# Patient Record
Sex: MALE | URBAN - METROPOLITAN AREA
[De-identification: names, ages, dates, MRNs, and addresses within clinical notes are randomized per-mention and may not be internally consistent; named-entity substitution may affect disease eponyms.]

---

## 2018-03-13 ENCOUNTER — APPOINTMENT (RX ONLY)
Dept: URBAN - METROPOLITAN AREA CLINIC 4 | Facility: CLINIC | Age: 28
Setting detail: DERMATOLOGY
End: 2018-03-13

## 2018-03-13 DIAGNOSIS — L81.4 OTHER MELANIN HYPERPIGMENTATION: ICD-10-CM

## 2018-03-13 DIAGNOSIS — Z71.89 OTHER SPECIFIED COUNSELING: ICD-10-CM

## 2018-03-13 DIAGNOSIS — L72.0 EPIDERMAL CYST: ICD-10-CM

## 2018-03-13 DIAGNOSIS — L70.0 ACNE VULGARIS: ICD-10-CM

## 2018-03-13 DIAGNOSIS — L30.9 DERMATITIS, UNSPECIFIED: ICD-10-CM

## 2018-03-13 DIAGNOSIS — L74.51 PRIMARY FOCAL HYPERHIDROSIS: ICD-10-CM

## 2018-03-13 DIAGNOSIS — L71.0 PERIORAL DERMATITIS: ICD-10-CM

## 2018-03-13 PROBLEM — L74.513 PRIMARY FOCAL HYPERHIDROSIS, SOLES: Status: ACTIVE | Noted: 2018-03-13

## 2018-03-13 PROCEDURE — ? TREATMENT REGIMEN

## 2018-03-13 PROCEDURE — 10060 I&D ABSCESS SIMPLE/SINGLE: CPT

## 2018-03-13 PROCEDURE — ? COUNSELING

## 2018-03-13 PROCEDURE — ? INCISION AND DRAINAGE

## 2018-03-13 PROCEDURE — ? PRESCRIPTION

## 2018-03-13 PROCEDURE — 99203 OFFICE O/P NEW LOW 30 MIN: CPT | Mod: 25

## 2018-03-13 PROCEDURE — ? ADDITIONAL NOTES

## 2018-03-13 RX ORDER — TRIAMCINOLONE ACETONIDE 1 MG/G
CREAM TOPICAL
Qty: 1 | Refills: 1 | Status: ERX | COMMUNITY
Start: 2018-03-13

## 2018-03-13 RX ORDER — ADAPALENE 3 MG/G
GEL TOPICAL
Qty: 1 | Refills: 5 | Status: ERX | COMMUNITY
Start: 2018-03-13

## 2018-03-13 RX ORDER — DOXYCYCLINE HYCLATE 100 MG/1
CAPSULE, GELATIN COATED ORAL BID
Qty: 60 | Refills: 1 | Status: ERX | COMMUNITY
Start: 2018-03-13

## 2018-03-13 RX ADMIN — ADAPALENE: 3 GEL TOPICAL at 16:31

## 2018-03-13 RX ADMIN — DOXYCYCLINE HYCLATE: 100 CAPSULE, GELATIN COATED ORAL at 16:29

## 2018-03-13 RX ADMIN — TRIAMCINOLONE ACETONIDE: 1 CREAM TOPICAL at 16:30

## 2018-03-13 ASSESSMENT — LOCATION SIMPLE DESCRIPTION DERM
LOCATION SIMPLE: LEFT PLANTAR SURFACE
LOCATION SIMPLE: CHEST
LOCATION SIMPLE: RIGHT FOREHEAD
LOCATION SIMPLE: RIGHT PLANTAR SURFACE
LOCATION SIMPLE: RIGHT CHEEK
LOCATION SIMPLE: LOWER BACK
LOCATION SIMPLE: GLABELLA
LOCATION SIMPLE: RIGHT UPPER BACK

## 2018-03-13 ASSESSMENT — LOCATION DETAILED DESCRIPTION DERM
LOCATION DETAILED: RIGHT MEDIAL FOREHEAD
LOCATION DETAILED: GLABELLA
LOCATION DETAILED: RIGHT MEDIAL UPPER BACK
LOCATION DETAILED: LEFT MEDIAL PLANTAR MIDFOOT
LOCATION DETAILED: SUPERIOR LUMBAR SPINE
LOCATION DETAILED: STERNUM
LOCATION DETAILED: RIGHT MEDIAL PLANTAR MIDFOOT
LOCATION DETAILED: RIGHT SUPERIOR CENTRAL MALAR CHEEK

## 2018-03-13 ASSESSMENT — LOCATION ZONE DERM
LOCATION ZONE: FACE
LOCATION ZONE: TRUNK
LOCATION ZONE: FEET

## 2018-03-13 NOTE — PROCEDURE: COUNSELING
Detail Level: Zone
Doxycycline Counseling:  Patient counseled regarding possible photosensitivity and increased risk for sunburn.  Patient instructed to avoid sunlight, if possible.  When exposed to sunlight, patients should wear protective clothing, sunglasses, and sunscreen.  The patient was instructed to call the office immediately if the following severe adverse effects occur:  hearing changes, easy bruising/bleeding, severe headache, or vision changes.  The patient verbalized understanding of the proper use and possible adverse effects of doxycycline.  All of the patient's questions and concerns were addressed.
Benzoyl Peroxide Counseling: Patient counseled that medicine may cause skin irritation and bleach clothing.  In the event of skin irritation, the patient was advised to reduce the amount of the drug applied or use it less frequently.   The patient verbalized understanding of the proper use and possible adverse effects of benzoyl peroxide.  All of the patient's questions and concerns were addressed.
Tetracycline Counseling: Patient counseled regarding possible photosensitivity and increased risk for sunburn.  Patient instructed to avoid sunlight, if possible.  When exposed to sunlight, patients should wear protective clothing, sunglasses, and sunscreen.  The patient was instructed to call the office immediately if the following severe adverse effects occur:  hearing changes, easy bruising/bleeding, severe headache, or vision changes.  The patient verbalized understanding of the proper use and possible adverse effects of tetracycline.  All of the patient's questions and concerns were addressed. Patient understands to avoid pregnancy while on therapy due to potential birth defects.
Spironolactone Counseling: Patient advised regarding risks of diarrhea, abdominal pain, hyperkalemia, birth defects (for female patients), liver toxicity and renal toxicity. The patient may need blood work to monitor liver and kidney function and potassium levels while on therapy. The patient verbalized understanding of the proper use and possible adverse effects of spironolactone.  All of the patient's questions and concerns were addressed.
Bactrim Counseling:  I discussed with the patient the risks of sulfa antibiotics including but not limited to GI upset, allergic reaction, drug rash, diarrhea, dizziness, photosensitivity, and yeast infections.  Rarely, more serious reactions can occur including but not limited to aplastic anemia, agranulocytosis, methemoglobinemia, blood dyscrasias, liver or kidney failure, lung infiltrates or desquamative/blistering drug rashes.
Erythromycin Counseling:  I discussed with the patient the risks of erythromycin including but not limited to GI upset, allergic reaction, drug rash, diarrhea, increase in liver enzymes, and yeast infections.
Minocycline Counseling: Patient advised regarding possible photosensitivity and discoloration of the teeth, skin, lips, tongue and gums.  Patient instructed to avoid sunlight, if possible.  When exposed to sunlight, patients should wear protective clothing, sunglasses, and sunscreen.  The patient was instructed to call the office immediately if the following severe adverse effects occur:  hearing changes, easy bruising/bleeding, severe headache, or vision changes.  The patient verbalized understanding of the proper use and possible adverse effects of minocycline.  All of the patient's questions and concerns were addressed.
Include Pregnancy/Lactation Warning?: No
High Dose Vitamin A Pregnancy And Lactation Text: High dose vitamin A therapy is contraindicated during pregnancy and breast feeding.
Tazorac Counseling:  Patient advised that medication is irritating and drying.  Patient may need to apply sparingly and wash off after an hour before eventually leaving it on overnight.  The patient verbalized understanding of the proper use and possible adverse effects of tazorac.  All of the patient's questions and concerns were addressed.
Birth Control Pills Counseling: Birth Control Pill Counseling: I discussed with the patient the potential side effects of OCPs including but not limited to increased risk of stroke, heart attack, thrombophlebitis, deep venous thrombosis, hepatic adenomas, breast changes, GI upset, headaches, and depression.  The patient verbalized understanding of the proper use and possible adverse effects of OCPs. All of the patient's questions and concerns were addressed.
Topical Retinoid counseling:  Patient advised to apply a pea-sized amount only at bedtime and wait 30 minutes after washing their face before applying.  If too drying, patient may add a non-comedogenic moisturizer. The patient verbalized understanding of the proper use and possible adverse effects of retinoids.  All of the patient's questions and concerns were addressed.
Doxycycline Pregnancy And Lactation Text: This medication is Pregnancy Category D and not consider safe during pregnancy. It is also excreted in breast milk but is considered safe for shorter treatment courses.
Dapsone Pregnancy And Lactation Text: This medication is Pregnancy Category C and is not considered safe during pregnancy or breast feeding.
Azithromycin Counseling:  I discussed with the patient the risks of azithromycin including but not limited to GI upset, allergic reaction, drug rash, diarrhea, and yeast infections.
Azithromycin Pregnancy And Lactation Text: This medication is considered safe during pregnancy and is also secreted in breast milk.
High Dose Vitamin A Counseling: Side effects reviewed, pt to contact office should one occur.
Tetracycline Pregnancy And Lactation Text: This medication is Pregnancy Category D and not consider safe during pregnancy. It is also excreted in breast milk.
Topical Clindamycin Pregnancy And Lactation Text: This medication is Pregnancy Category B and is considered safe during pregnancy. It is unknown if it is excreted in breast milk.
Topical Sulfur Applications Counseling: Topical Sulfur Counseling: Patient counseled that this medication may cause skin irritation or allergic reactions.  In the event of skin irritation, the patient was advised to reduce the amount of the drug applied or use it less frequently.   The patient verbalized understanding of the proper use and possible adverse effects of topical sulfur application.  All of the patient's questions and concerns were addressed.
Topical Sulfur Applications Pregnancy And Lactation Text: This medication is Pregnancy Category C and has an unknown safety profile during pregnancy. It is unknown if this topical medication is excreted in breast milk.
Benzoyl Peroxide Pregnancy And Lactation Text: This medication is Pregnancy Category C. It is unknown if benzoyl peroxide is excreted in breast milk.
Dapsone Counseling: I discussed with the patient the risks of dapsone including but not limited to hemolytic anemia, agranulocytosis, rashes, methemoglobinemia, kidney failure, peripheral neuropathy, headaches, GI upset, and liver toxicity.  Patients who start dapsone require monitoring including baseline LFTs and weekly CBCs for the first month, then every month thereafter.  The patient verbalized understanding of the proper use and possible adverse effects of dapsone.  All of the patient's questions and concerns were addressed.
Tazorac Pregnancy And Lactation Text: This medication is not safe during pregnancy. It is unknown if this medication is excreted in breast milk.
Birth Control Pills Pregnancy And Lactation Text: This medication should be avoided if pregnant and for the first 30 days post-partum.
Bactrim Pregnancy And Lactation Text: This medication is Pregnancy Category D and is known to cause fetal risk.  It is also excreted in breast milk.
Isotretinoin Counseling: Patient should get monthly blood tests, not donate blood, not drive at night if vision affected, not share medication, and not undergo elective surgery for 6 months after tx completed. Side effects reviewed, pt to contact office should one occur.
Spironolactone Pregnancy And Lactation Text: This medication can cause feminization of the male fetus and should be avoided during pregnancy. The active metabolite is also found in breast milk.
Topical Retinoid Pregnancy And Lactation Text: This medication is Pregnancy Category C. It is unknown if this medication is excreted in breast milk.
Isotretinoin Pregnancy And Lactation Text: This medication is Pregnancy Category X and is considered extremely dangerous during pregnancy. It is unknown if it is excreted in breast milk.
Erythromycin Pregnancy And Lactation Text: This medication is Pregnancy Category B and is considered safe during pregnancy. It is also excreted in breast milk.
Topical Clindamycin Counseling: Patient counseled that this medication may cause skin irritation or allergic reactions.  In the event of skin irritation, the patient was advised to reduce the amount of the drug applied or use it less frequently.   The patient verbalized understanding of the proper use and possible adverse effects of clindamycin.  All of the patient's questions and concerns were addressed.
Detail Level: Detailed
Detail Level: Generalized

## 2018-03-13 NOTE — PROCEDURE: INCISION AND DRAINAGE
Dressing: dry sterile dressing
Render Postcare In Note?: No
Suture Text: The incision was partially closed with
Epidermal Sutures: 4-0 Ethilon
Anesthesia Volume In Cc: -
Detail Level: Detailed
Post-Care Instructions: I reviewed with the patient in detail post-care instructions. Patient should keep wound covered and call the office should any redness, pain, swelling or worsening occur.
Wound Care: Vaseline
Size Of Lesion In Cm (Optional But May Be Required For Some Insurances): 0
Curette Text (Optional): After the contents were expressed a curette was used to partially remove the cyst wall.
Epidermal Closure: simple interrupted
Preparation Text: The area was prepped in the usual clean fashion.
Method: 11 blade
Consent was obtained and risks were reviewed including but not limited to delayed wound healing, infection, need for multiple I and D's, and pain.
Lesion Type: Cyst

## 2023-11-14 ENCOUNTER — LAB REQUISITION (OUTPATIENT)
Dept: LAB | Facility: HOSPITAL | Age: 33
End: 2023-11-14
Payer: COMMERCIAL

## 2023-11-14 DIAGNOSIS — R19.4 CHANGE IN BOWEL HABIT: ICD-10-CM

## 2023-11-14 PROCEDURE — 88305 TISSUE EXAM BY PATHOLOGIST: CPT | Performed by: SPECIALIST

## 2023-11-14 PROCEDURE — 88342 IMHCHEM/IMCYTCHM 1ST ANTB: CPT | Performed by: SPECIALIST

## 2023-11-20 PROCEDURE — 88342 IMHCHEM/IMCYTCHM 1ST ANTB: CPT | Performed by: SPECIALIST

## 2023-11-20 PROCEDURE — 88305 TISSUE EXAM BY PATHOLOGIST: CPT | Performed by: SPECIALIST

## 2023-12-20 ENCOUNTER — APPOINTMENT (OUTPATIENT)
Dept: RHEUMATOLOGY | Facility: MEDICAL CENTER | Age: 33
End: 2023-12-20

## 2024-01-13 ASSESSMENT — RHEUMATOLOGY NEW PATIENT QUESTIONNAIRE
DURATION: 30-60 MINS
DEPRESSION: Y
MUCUS STOOL: Y
CHARACTERISTIC: BETTER WITH ACTIVITY
ANXIETY: Y

## 2024-01-16 ENCOUNTER — OFFICE VISIT (OUTPATIENT)
Dept: RHEUMATOLOGY | Facility: MEDICAL CENTER | Age: 34
End: 2024-01-16
Attending: STUDENT IN AN ORGANIZED HEALTH CARE EDUCATION/TRAINING PROGRAM
Payer: COMMERCIAL

## 2024-01-16 ENCOUNTER — HOSPITAL ENCOUNTER (OUTPATIENT)
Dept: LAB | Facility: MEDICAL CENTER | Age: 34
End: 2024-01-16
Attending: STUDENT IN AN ORGANIZED HEALTH CARE EDUCATION/TRAINING PROGRAM
Payer: COMMERCIAL

## 2024-01-16 VITALS
WEIGHT: 172 LBS | TEMPERATURE: 98.2 F | SYSTOLIC BLOOD PRESSURE: 118 MMHG | HEART RATE: 83 BPM | DIASTOLIC BLOOD PRESSURE: 68 MMHG | OXYGEN SATURATION: 95 % | BODY MASS INDEX: 24.62 KG/M2 | HEIGHT: 70 IN

## 2024-01-16 DIAGNOSIS — R19.5 INCREASED MUCUS IN STOOL: ICD-10-CM

## 2024-01-16 DIAGNOSIS — D84.9 IMMUNOSUPPRESSED STATUS (HCC): ICD-10-CM

## 2024-01-16 DIAGNOSIS — M45.A0 NON-RADIOGRAPHIC AXIAL SPONDYLOARTHRITIS (HCC): ICD-10-CM

## 2024-01-16 DIAGNOSIS — M47.817 DJD (DEGENERATIVE JOINT DISEASE), LUMBOSACRAL: ICD-10-CM

## 2024-01-16 LAB
ALBUMIN SERPL BCP-MCNC: 4.7 G/DL (ref 3.2–4.9)
ALBUMIN/GLOB SERPL: 1.5 G/DL
ALP SERPL-CCNC: 73 U/L (ref 30–99)
ALT SERPL-CCNC: 18 U/L (ref 2–50)
ANION GAP SERPL CALC-SCNC: 12 MMOL/L (ref 7–16)
AST SERPL-CCNC: 29 U/L (ref 12–45)
BASOPHILS # BLD AUTO: 0.8 % (ref 0–1.8)
BASOPHILS # BLD: 0.05 K/UL (ref 0–0.12)
BILIRUB SERPL-MCNC: 0.3 MG/DL (ref 0.1–1.5)
BUN SERPL-MCNC: 15 MG/DL (ref 8–22)
CALCIUM ALBUM COR SERPL-MCNC: 8.9 MG/DL (ref 8.5–10.5)
CALCIUM SERPL-MCNC: 9.5 MG/DL (ref 8.5–10.5)
CHLORIDE SERPL-SCNC: 101 MMOL/L (ref 96–112)
CO2 SERPL-SCNC: 28 MMOL/L (ref 20–33)
CREAT SERPL-MCNC: 0.84 MG/DL (ref 0.5–1.4)
CRP SERPL HS-MCNC: <0.3 MG/DL (ref 0–0.75)
EOSINOPHIL # BLD AUTO: 0.11 K/UL (ref 0–0.51)
EOSINOPHIL NFR BLD: 1.7 % (ref 0–6.9)
ERYTHROCYTE [DISTWIDTH] IN BLOOD BY AUTOMATED COUNT: 42.5 FL (ref 35.9–50)
ERYTHROCYTE [SEDIMENTATION RATE] IN BLOOD BY WESTERGREN METHOD: 12 MM/HOUR (ref 0–20)
GFR SERPLBLD CREATININE-BSD FMLA CKD-EPI: 118 ML/MIN/1.73 M 2
GLOBULIN SER CALC-MCNC: 3.1 G/DL (ref 1.9–3.5)
GLUCOSE SERPL-MCNC: 84 MG/DL (ref 65–99)
HCT VFR BLD AUTO: 44.8 % (ref 42–52)
HGB BLD-MCNC: 15 G/DL (ref 14–18)
IMM GRANULOCYTES # BLD AUTO: 0.01 K/UL (ref 0–0.11)
IMM GRANULOCYTES NFR BLD AUTO: 0.2 % (ref 0–0.9)
LYMPHOCYTES # BLD AUTO: 2.55 K/UL (ref 1–4.8)
LYMPHOCYTES NFR BLD: 40.4 % (ref 22–41)
MCH RBC QN AUTO: 32.1 PG (ref 27–33)
MCHC RBC AUTO-ENTMCNC: 33.5 G/DL (ref 32.3–36.5)
MCV RBC AUTO: 95.9 FL (ref 81.4–97.8)
MONOCYTES # BLD AUTO: 0.52 K/UL (ref 0–0.85)
MONOCYTES NFR BLD AUTO: 8.2 % (ref 0–13.4)
NEUTROPHILS # BLD AUTO: 3.07 K/UL (ref 1.82–7.42)
NEUTROPHILS NFR BLD: 48.7 % (ref 44–72)
NRBC # BLD AUTO: 0 K/UL
NRBC BLD-RTO: 0 /100 WBC (ref 0–0.2)
PLATELET # BLD AUTO: 391 K/UL (ref 164–446)
PMV BLD AUTO: 9.9 FL (ref 9–12.9)
POTASSIUM SERPL-SCNC: 3.7 MMOL/L (ref 3.6–5.5)
PROT SERPL-MCNC: 7.8 G/DL (ref 6–8.2)
RBC # BLD AUTO: 4.67 M/UL (ref 4.7–6.1)
SODIUM SERPL-SCNC: 141 MMOL/L (ref 135–145)
WBC # BLD AUTO: 6.3 K/UL (ref 4.8–10.8)

## 2024-01-16 PROCEDURE — 99205 OFFICE O/P NEW HI 60 MIN: CPT | Performed by: STUDENT IN AN ORGANIZED HEALTH CARE EDUCATION/TRAINING PROGRAM

## 2024-01-16 PROCEDURE — 80053 COMPREHEN METABOLIC PANEL: CPT

## 2024-01-16 PROCEDURE — 3074F SYST BP LT 130 MM HG: CPT | Performed by: STUDENT IN AN ORGANIZED HEALTH CARE EDUCATION/TRAINING PROGRAM

## 2024-01-16 PROCEDURE — 99211 OFF/OP EST MAY X REQ PHY/QHP: CPT | Performed by: STUDENT IN AN ORGANIZED HEALTH CARE EDUCATION/TRAINING PROGRAM

## 2024-01-16 PROCEDURE — 86671 FUNGUS NES ANTIBODY: CPT | Mod: 91

## 2024-01-16 PROCEDURE — 85025 COMPLETE CBC W/AUTO DIFF WBC: CPT

## 2024-01-16 PROCEDURE — 86140 C-REACTIVE PROTEIN: CPT

## 2024-01-16 PROCEDURE — 85652 RBC SED RATE AUTOMATED: CPT

## 2024-01-16 PROCEDURE — 36415 COLL VENOUS BLD VENIPUNCTURE: CPT

## 2024-01-16 PROCEDURE — 3078F DIAST BP <80 MM HG: CPT | Performed by: STUDENT IN AN ORGANIZED HEALTH CARE EDUCATION/TRAINING PROGRAM

## 2024-01-16 RX ORDER — ADALIMUMAB 40MG/0.4ML
40 KIT SUBCUTANEOUS
Qty: 2 EACH | Refills: 5 | Status: SHIPPED | OUTPATIENT
Start: 2024-01-16

## 2024-01-16 RX ORDER — SECUKINUMAB 150 MG/ML
INJECTION SUBCUTANEOUS
COMMUNITY
Start: 2024-01-08 | End: 2024-01-16

## 2024-01-16 RX ORDER — MELOXICAM 15 MG/1
TABLET ORAL
COMMUNITY
Start: 2023-01-01

## 2024-01-16 RX ORDER — DEXTROAMPHETAMINE SACCHARATE, AMPHETAMINE ASPARTATE MONOHYDRATE, DEXTROAMPHETAMINE SULFATE AND AMPHETAMINE SULFATE 3.75; 3.75; 3.75; 3.75 MG/1; MG/1; MG/1; MG/1
CAPSULE, EXTENDED RELEASE ORAL
COMMUNITY
Start: 2024-01-12

## 2024-01-16 RX ORDER — NITROGLYCERIN 4 MG/G
OINTMENT RECTAL
COMMUNITY
Start: 2023-11-29

## 2024-01-16 ASSESSMENT — PATIENT HEALTH QUESTIONNAIRE - PHQ9
SUM OF ALL RESPONSES TO PHQ QUESTIONS 1-9: 6
CLINICAL INTERPRETATION OF PHQ2 SCORE: 1
5. POOR APPETITE OR OVEREATING: 0 - NOT AT ALL

## 2024-01-16 NOTE — PROGRESS NOTES
[facility] RHEUMATOLOGY  [address], [address], [place] [address]  Phone: [phone] ? Fax: [phone]  [facility]/Health-Services/Rheumatology    NEW PATIENT VISIT NOTE      DATE OF SERVICE: 01/16/2024         Subjective     REFERRING PRACTITIONER:  Loretta Alva  [address] [place] [address]    PATIENT IDENTIFICATION:  Johny Candelario  [address]  [place] [address]    YOB: 1990    MEDICAL RECORD NUMBER: [id number]         CHIEF COMPLAINT:   Chief Complaint   Patient presents with    New Patient     Non-radiographic axial spondyloarthritis (HCC)       HISTORY OF PRESENT ILLNESS:  Johny Candelario is a 33 y.o. male with pertinent history notable for HLA-B27 positive nonradiographic axial spondyloarthritis diagnosed in 2020 (reportedly symptomatic from 4/2020), DJD of lumbosacral spine, and serologic autoimmunity (positive CAMILLE). Previously under the care of a rheumatologist in [place] (Dr. Loretta Alva at [facility] in [place], [place]), he relocated to [place] in 6/2023 and presents to establish care for continued evaluation and management of his condition. Reports onset of symptoms in 4/2020 with chronic lower back pain with prolonged morning stiffness that improved with activity which prompted his diagnostic evaluation at the time. Following his spondyloarthritis diagnosis, he was initially started on Humira which he took for only 1 dose before being switched to Cosentyx due to insurance issues. He has been doing quite well since then with no significant back pain or stiffness and feels that Cosentyx has been very effective for his condition. However, he reports a history of episodic yeast infection of the anus with some fissures for which he has been using antifungal and topical anesthetic which have been helpful. He has also been noticing increased mucus in his stool and some blood after wiping which he thinks is from the anal fissure and is  scheduled to be seen by gastroenterology due to a family history of colon cancer in his father. Reports other aspects of his symptoms and medical history as noted on the questionnaire below.    Pertinent treatments: Gabapentin 100 mg twice daily PRN (unclear benefit), meloxicam 15 mg oral daily (effective), Humira (stopped after 1 dose due to insurance issues), Cosentyx 150 mg SC every 2 weeks (2020-1/2024), Humira 40 mg SC every 2 weeks (ordered 1/16/2024-present).    Pertinent positive labs: Positive HLA-B27 and positive CAMILLE 1:160 with negative reflex panel (in 8/2021 per former rheumatologist's note)    Pertinent negative labs: Negative RF, anti-CCP, C3 and C4 (in 8/2021), SPEP, HBV, QTB, eGFR, creatinine, LFTs, and CBC (in 10/2022).    Pertinent XR imaging (in 10/2022 reports on former rheumatologist's note): Lumbar spine with facet arthrosis from L3 through S1, mild at L3-L4, mild-to-moderate at L4-L5, and moderate-to-severe at L5-S1; spondylitic ridging of the posterior endplates at L5-S1 suspicious for spondylolysis with spondylolisthesis. Thoracic spine and SI joints with no evidence of degenerative or inflammatory arthropathy.    [facility] Rheumatology New Patient History Form    1/13/2024 12:23 PM PST - Filed by Patient   Demographic Information   Marital Status: Single   Occupation:    Highest Level of Education: Bachelor's degree   MAIN REASON FOR VISIT Ankylosing spondylitis and GI inflammation   HISTORY OF PRESENT ILLNESS   Date of symptom onset: 4/1/2020   Preceding incident/ailment: N/A   Describe/list your symptoms:    Exacerbating factors:    Alleviating factors:    Helpful medications:    Ineffective medications:    Severity of pain (scale of 1-10):    Personal/emotional stressors:    Vel All The Areas Of Pain    REVIEW OF SYMPTOMS    General   Fevers    Chills    Night sweats    Malaise    Fatigue    Unintentional weight loss    Musculoskeletal   Joint pain    Morning stiffness duration 30-60  mins   Morning stiffness characteristic Better with activity   Joint swelling    Joint instability    Tendon pain    Muscle pain    Body aches    Dermatologic   Hair loss with bald spots    Hair shedding    Skin thickening    Skin plaques    Sunlight-induced skin rash    Cold-induced color changes (white, purple, red on rewarming)    Neurologic/Psychiatric   Weakness    Spasms    Tingling    Burning    Numbness    Insomnia    Anxiety Yes   Depression Yes   Head/Eyes   Headaches    Temple pain    Dizziness    Dry eyes    Eye pain    Eye redness    Blurry vision    Vision loss    Ears/Nose   Ear pain    Ringing in ears    Vertigo    Hearing loss    Nasal ulcers    Nosebleeds    Sinus pain    Nasal congestion    Snoring    Mouth/Throat   Oral ulcers    Bleeding gums    Dry mouth    Cavities    Sore throat    Sticking in throat    Difficulty speaking    Neck/Lymphatics   Thyroid pain    Thyroid swelling    Lymph node swelling    Cardiac/Respiratory   Chest pain with breathing    Dry cough    Cough with bloody phlegm    Shortness of breath    Fast heartbeats    Irregular heartbeats    Gastrointestinal   Nausea    Vomiting    Difficulty swallowing    Heartburn    Abdominal pain    Bloody stool    Mucus stool Yes   Genitourinary   Pelvic pain    Genital ulcers    Abnormal discharge    Burning urination    Frothy urine    Blood in urine    MEDICAL/PERSONAL HISTORY DETAILS   Current Medical Problems:    Past/Resolved Medical Problems:    Surgeries/Procedures (include month/year):    Prescription/Over-The-Counter/Herbal Medications:    Medication/Food/Material Allergies (include reactions):    Immunizations (include month/year)    Alcohol/Tobacco/Recreational Drugs:    Family Medical History (father, mother, siblings only): Father - colon cancer, cardiovascular     REVIEW OF SYSTEMS:  Except as noted in the history above, relevant review of systems with emphasis on autoimmune rheumatic diseases was otherwise  "negative.      ACTIVE PROBLEM LIST:  Patient Active Problem List    Diagnosis Date Noted    HLA-B27 positive non-radiographic axial spondyloarthritis (HCC) 01/16/2024    DJD (degenerative joint disease), lumbosacral 01/16/2024    Immunosuppressed status (HCC) 01/16/2024    Increased mucus in stool 01/16/2024       PAST MEDICAL HISTORY:  History reviewed. No pertinent past medical history.    PAST SURGICAL HISTORY:  History reviewed. No pertinent surgical history.    MEDICATIONS:  Current Outpatient Medications   Medication Sig    amphetamine-dextroamphetamine (ADDERALL XR) 15 MG XR capsule     meloxicam (MOBIC) 15 MG tablet     RECTIV 0.4 % ointment     Adalimumab (HUMIRA PEN) 40 MG/0.4ML Pen-injector Kit Inject 40 mg under the skin every 14 days.       ALLERGIES:   No Known Allergies    IMMUNIZATIONS:  Immunization History   Administered Date(s) Administered    MODERNA SARS-COV-2 VACCINE (12+) 03/08/2021, 04/05/2021       SOCIAL HISTORY:   Social History     Socioeconomic History    Marital status: Single       FAMILY HISTORY:  History reviewed. No pertinent family history.         Objective     Vital Signs: /68 (BP Location: Left arm, Patient Position: Sitting, BP Cuff Size: Adult)   Pulse 83   Temp 36.8 °C (98.2 °F) (Temporal)   Ht 1.778 m (5' 10\")   Wt 78 kg (172 lb)   SpO2 95% Body mass index is 24.68 kg/m².    General: Appears well and comfortable  Eyes: No scleral or conjunctival lesions  ENT: No apparent oral or nasal lesions  Head/Neck: No apparent scalp or neck lesions  Cardiovascular: Regular rate and rhythm; no pericardial rubs  Respiratory: Breathing quiet and unlabored; no rales or pleural rubs  Gastrointestinal: No apparent organomegaly or abdominal masses  Integumentary: No significant cutaneous lesions or dyspigmentation  Musculoskeletal: No significant joint tenderness, periarticular soft tissue swelling, warmth, erythema, or overt synovitis; no significant restriction in range of " motion of joints examined  Neurologic: No focal sensory or motor deficits  Psychiatric: Mood and affect appropriate      LABORATORY RESULTS REVIEWED AND INTERPRETED BY ME:  No loadable results found in the system. Pertinent non-system results, if applicable, summarized under history above.      RADIOLOGY RESULTS REVIEWED AND INTERPRETED BY ME:  No loadable results found in the system. Pertinent non-system results, if applicable, summarized under history above.      All relevant laboratory and imaging results reported on this note were reviewed and interpreted by me.         Assessment & Plan     oJhny Candelario is a 33 y.o. male with history as noted above whose presentation merits the following clinical impressions and recommendations:    1. HLA-B27 positive non-radiographic axial spondyloarthritis (HCC)  Clinically appears to be very low disease activity well-controlled on the current regimen of Cosentyx. However, given this medication is not on the preferred formulary of his current insurance and considering his reported increased mucus in stool raising concern for possible evolving IBD, reasonable to switch to Humira which would be preferred in the setting of IBD and is on the insurance's preferred formulary list.  - CRP QUANTITIVE (NON-CARDIAC); Future  - Sed Rate; Future  - Adalimumab (HUMIRA PEN) 40 MG/0.4ML Pen-injector Kit; Inject 40 mg under the skin every 14 days.  Dispense: 2 Each; Refill: 5  - Discontinue Cosentyx 150 mg SC every 2 weeks after finishing the current supply before starting Humira    2. DJD (degenerative joint disease), lumbosacral  Considered the etiology of chronic back pain which can be managed supportively.  - CBC WITH DIFFERENTIAL; Future  - Comp Metabolic Panel; Future  - Topical and oral nonopioid analgesics as well as targeted physical therapy as needed  - Consider referral to interventional pain management if that becomes necessary    3. Increased mucus in stool  Raises concern  for IBD in the setting of his positive HLA-B27, so reasonable to undertake the workup noted below.  - SACCHAROMYCES CEREVISIAE PANEL; Future  - CALPROTECTIN,FECAL; Future  - FECAL LACTOFERRIN QUALITATIVE; Future  - Agree with gastroenterology evaluation scheduled    4. Immunosuppressed status (HCC)  Presently with no history, physical, or laboratory evidence to suggest significant adverse drug effects or opportunistic infections, but need routine monitoring per guidelines.  - CBC WITH DIFFERENTIAL; Future  - Comp Metabolic Panel; Future  - Need to ascertain age-appropriate vaccines in addition to the ones listed above under immunizations      The above assessment and plan were discussed with the patient who acknowledged understanding of the plan.    FOLLOW-UP: Return in about 3 months (around 4/16/2024) for Short.         Thank you for the opportunity to participate in the care of Johny Candelario.    Ap Garcia MD, MS, FACR  Rheumatologist, [facility] Rheumatology ? [facility]   of Clinical Medicine, Department of Internal Medicine  Formerly [facility] ? [facility]

## 2024-01-16 NOTE — PATIENT INSTRUCTIONS
AFTER VISIT INSTRUCTIONS    Below are important information to help you navigate your healthcare needs and help us serve you safely and effectively:  If laboratory tests and/or imaging studies were ordered, remember to go get them done as instructed.  If new prescriptions and/or refills were sent, remember to go pick them up from your local pharmacy, or call the specialty pharmacy to request shipment.  Always take your prescription medications exactly as prescribed unless instructed otherwise.  Note that antirheumatic drugs and steroids are immunosuppressive which means they increase your risk of infections and have multiple potential adverse effects on various organ systems in your body, though most of them are uncommon.  It is important that you are up-to-date on age-appropriate immunizations, particularly shingles and bacterial/viral pneumonia vaccines, which you can request from me or your primary care provider.  Be sure to read the drug package inserts to learn about the potential side effects of your medications before you start taking them.  If you experience any significant drug side effects, stop taking the medication and notify me promptly, and depending on the severity of the side effects, consider going to an urgent care or emergency department for immediate attention.  If there are significant findings on your lab tests and imaging studies that warrant further action, I will notify you with explanations via [employer] [employer] or phone call, otherwise you can view them on [employer] and let me know if you have any questions.  Note that Prom[employer]et messages are typically read during office hours and may take 1-7 business days before a response depending on the urgency of the situation and how busy my clinic schedule is.  In general, [employer]et messaging is for non-urgent matters that do not require immediate attention, so for urgent matters that cannot wait, you are advised to go to an urgent care.  Lastly, you are granted  [facility] access to my documentation of your visit and are encouraged to read my note which details my assessment and plan for your condition.  To learn more about your condition and rheumatic diseases evaluated and treated by rheumatologists, as well as gain access to many helpful resources about these diseases, visit our website at [email or web address].

## 2024-01-18 LAB
BAKER'S YEAST IGA QN IA: 10.5 UNITS (ref 0–24.9)
BAKER'S YEAST IGG QN IA: 22.7 UNITS (ref 0–24.9)

## 2024-02-12 ENCOUNTER — HOSPITAL ENCOUNTER (OUTPATIENT)
Facility: MEDICAL CENTER | Age: 34
End: 2024-02-12
Attending: STUDENT IN AN ORGANIZED HEALTH CARE EDUCATION/TRAINING PROGRAM
Payer: COMMERCIAL

## 2024-02-12 PROCEDURE — 83993 ASSAY FOR CALPROTECTIN FECAL: CPT

## 2024-02-12 PROCEDURE — 83630 LACTOFERRIN FECAL (QUAL): CPT

## 2024-02-16 DIAGNOSIS — R19.5 INCREASED MUCUS IN STOOL: ICD-10-CM

## 2024-02-16 LAB — LACTOFERRIN STL QL IA: POSITIVE

## 2024-02-20 LAB — CALPROTECTIN STL-MCNT: 122 UG/G

## 2024-02-26 ENCOUNTER — PATIENT MESSAGE (OUTPATIENT)
Dept: RHEUMATOLOGY | Facility: MEDICAL CENTER | Age: 34
End: 2024-02-26
Payer: COMMERCIAL

## 2024-02-26 NOTE — PATIENT COMMUNICATION
[facility] requesting a new prescription for Humira syringes as the syringes have been approved through insurance. If  and patient prefer Humira pens a new PA will need to be completed.   Please advise,

## 2024-02-27 NOTE — PROGRESS NOTES
The original prescription I sent was for Humira Pen, so I do not understand why the PA was done for syringes. The PA needs to be done for the original prescription.    ÁNGEL

## 2024-02-28 ENCOUNTER — OFFICE VISIT (OUTPATIENT)
Dept: RHEUMATOLOGY | Facility: MEDICAL CENTER | Age: 34
End: 2024-02-28
Attending: STUDENT IN AN ORGANIZED HEALTH CARE EDUCATION/TRAINING PROGRAM
Payer: COMMERCIAL

## 2024-02-28 ENCOUNTER — HOSPITAL ENCOUNTER (OUTPATIENT)
Facility: MEDICAL CENTER | Age: 34
End: 2024-02-28
Attending: STUDENT IN AN ORGANIZED HEALTH CARE EDUCATION/TRAINING PROGRAM
Payer: COMMERCIAL

## 2024-02-28 VITALS
DIASTOLIC BLOOD PRESSURE: 70 MMHG | TEMPERATURE: 97.9 F | HEIGHT: 69 IN | WEIGHT: 175.13 LBS | BODY MASS INDEX: 25.94 KG/M2 | SYSTOLIC BLOOD PRESSURE: 116 MMHG | OXYGEN SATURATION: 98 % | HEART RATE: 60 BPM

## 2024-02-28 DIAGNOSIS — D84.9 IMMUNOSUPPRESSED STATUS (HCC): ICD-10-CM

## 2024-02-28 DIAGNOSIS — R19.5 POSITIVE FECAL IMMUNOCHEMICAL TEST: ICD-10-CM

## 2024-02-28 DIAGNOSIS — M47.817 DJD (DEGENERATIVE JOINT DISEASE), LUMBOSACRAL: ICD-10-CM

## 2024-02-28 DIAGNOSIS — M45.A0 NON-RADIOGRAPHIC AXIAL SPONDYLOARTHRITIS (HCC): ICD-10-CM

## 2024-02-28 PROCEDURE — 87591 N.GONORRHOEAE DNA AMP PROB: CPT

## 2024-02-28 PROCEDURE — 99214 OFFICE O/P EST MOD 30 MIN: CPT | Performed by: STUDENT IN AN ORGANIZED HEALTH CARE EDUCATION/TRAINING PROGRAM

## 2024-02-28 PROCEDURE — 3074F SYST BP LT 130 MM HG: CPT | Performed by: STUDENT IN AN ORGANIZED HEALTH CARE EDUCATION/TRAINING PROGRAM

## 2024-02-28 PROCEDURE — 84403 ASSAY OF TOTAL TESTOSTERONE: CPT

## 2024-02-28 PROCEDURE — 87086 URINE CULTURE/COLONY COUNT: CPT

## 2024-02-28 PROCEDURE — 3078F DIAST BP <80 MM HG: CPT | Performed by: STUDENT IN AN ORGANIZED HEALTH CARE EDUCATION/TRAINING PROGRAM

## 2024-02-28 PROCEDURE — 87491 CHLMYD TRACH DNA AMP PROBE: CPT

## 2024-02-28 PROCEDURE — 99211 OFF/OP EST MAY X REQ PHY/QHP: CPT | Performed by: STUDENT IN AN ORGANIZED HEALTH CARE EDUCATION/TRAINING PROGRAM

## 2024-02-28 RX ORDER — SECUKINUMAB 150 MG/ML
INJECTION SUBCUTANEOUS
COMMUNITY
Start: 2024-02-20

## 2024-02-28 ASSESSMENT — FIBROSIS 4 INDEX: FIB4 SCORE: 0.58

## 2024-02-28 NOTE — PATIENT INSTRUCTIONS
AFTER VISIT INSTRUCTIONS    Below are important information to help you navigate your healthcare needs and help us serve you safely and effectively:  If laboratory tests and/or imaging studies were ordered, remember to go get them done as instructed.  If new prescriptions and/or refills were sent, remember to go pick them up from your local pharmacy, or call the specialty pharmacy to request shipment.  Always take your prescription medications exactly as prescribed unless instructed otherwise.  Note that antirheumatic drugs and steroids are immunosuppressive which means they increase your risk of infections and have multiple potential adverse effects on various organ systems in your body, though many of them are uncommon.  It is important that you are up-to-date on age-appropriate immunizations, particularly shingles and bacterial/viral pneumonia vaccines, which you can request from me or your primary care provider.  Be sure to read the drug package inserts to learn about the potential side effects of your medications before you start taking them.  If you experience any significant drug side effects, stop taking the medication and notify me promptly, and depending on the severity of the side effects, consider going to an urgent care or emergency department for immediate attention.  If there are significant findings on your lab tests and imaging studies that warrant further action, I will notify you with explanations via Gigwellhart or phone call, otherwise you can view them on Cloud[employer]ner and let me know if you have any questions.  Note that Cloud[employer]ner messages are typically read during office hours and may take 1-7 business days before a response depending on the urgency of the situation and how busy my clinic schedule is.  In general, CloudPartner messaging is for non-urgent matters that do not require immediate attention, so for urgent matters that cannot wait, you are advised to go to an urgent care.  You are granted 4-Tellt  access to my documentation of your visit and are encouraged to read my note which details my assessment and plan for your condition.  To learn more about your condition and rheumatic diseases evaluated and treated by rheumatologists, as well as gain access to many helpful resources about these diseases, visit our website: [email or web address].  To properly dispose of your unused, unwanted, or residual medications/supplies, visit the Drug Enforcement Administration website to locate your closest drop-off location: www.jami.gov/everyday-takeback-day.

## 2024-02-28 NOTE — PROGRESS NOTES
[facility] RHEUMATOLOGY  [address], [address], [place] [address]  Phone: [phone] ? Fax: [phone]  [facility]/Health-Services/Rheumatology    FOLLOW-UP VISIT NOTE      DATE OF SERVICE: 02/28/2024         Subjective     PRIMARY CARE PRACTITIONER:  Julian Almazan M.D.  [address]  [place] [address]    PATIENT IDENTIFICATION:  Johny Olvera [facility]  [place] [address]    YOB: 1990    MEDICAL RECORD NUMBER: [id number]          CHIEF COMPLAINT:   Chief Complaint   Patient presents with    Follow-Up     Non-radiographic axial spondyloarthritis (HCC)       RHEUMATOLOGIC HISTORY:  Johny Candelario is a 33 y.o. male with pertinent history notable for HLA-B27 positive non-radiographic axial spondyloarthritis diagnosed in 2020 (reportedly symptomatic from 4/2020), DJD of lumbosacral spine, and serologic autoimmunity (positive CAMILLE). Previously under the care of a rheumatologist in [place] (Dr. Loretta Alva at [facility] in [place], [place]), he relocated to [place] in 6/2023 and initially presented on 1/16/24 to establish care for continued evaluation and management of his condition. Reported onset of symptoms in 4/2020 with chronic lower back pain with prolonged morning stiffness that improved with activity which prompted his diagnostic evaluation at the time. Following his spondyloarthritis diagnosis, he was initially started on Humira which he took for only 1 dose before being switched to Cosentyx due to insurance issues. He had been doing quite well since then with no significant back pain or stiffness and felt that Cosentyx had been very helpful for his condition. However, he reported a history of episodic yeast infection of the anus with some fissures for which he had been using antifungal and topical anesthetic which provided some relief. He had also been noticing increased mucus in his stool and some blood after wiping which he thought was  from the anal fissure. Given a family history of colon cancer in his father, he reportedly underwent colonoscopy in 11/2023 by a colorectal surgeon, Dr. Kyle Raymond in [place] (reportedly showed colonic inflammation) and later established with gastroenterology in [place]. Reported other aspects of his symptoms and medical history as noted on the initial visit questionnaire.    Pertinent treatments: Gabapentin 100 mg twice daily PRN (unclear benefit), meloxicam 15 mg oral daily (effective), Humira (stopped after 1 dose due to insurance issues), Cosentyx 150 mg SC every 2 weeks (2020-2/2024), Humira 40 mg SC every 2 weeks (ordered 1/16/24-present).    Pertinent positive labs: Positive HLA-B27 and positive CAMILLE 1:160 with negative reflex panel (in 8/2021 per former rheumatologist's note); positive fecal lactoferrin and fecal calprotectin 122 (in 2/2024).     Pertinent negative labs: Negative RF, anti-CCP, C3 and C4 (in 8/2021), SPEP, HBV, and QTB (in 10/2022), ASCA, CRP, ESR, eGFR, creatinine, LFTs, and CBC (in 1/2024).     Pertinent XR imaging (in 10/2022 reports on former rheumatologist's note): Lumbar spine with facet arthrosis from L3 through S1, mild at L3-L4, mild-to-moderate at L4-L5, and moderate-to-severe at L5-S1; spondylitic ridging of the posterior endplates at L5-S1 suspicious for spondylolysis with spondylolisthesis. Thoracic spine and SI joints with no evidence of degenerative or inflammatory arthropathy.      INTERVAL HISTORY:  Reports interval history as noted on the questionnaire below or scanned under media tab.  Notably minimal intermittent pain/stiffness in his upper/lower back, right ankle, and left thumb first MCP joint.  Still seeing mucus in his stool, but otherwise doing quite well, and still waiting for Humira shipment.    REVIEW OF SYSTEMS:  Except as noted in the history above, relevant review of systems with emphasis on autoimmune rheumatic diseases was otherwise negative.      ACTIVE  "PROBLEM LIST:  Patient Active Problem List    Diagnosis Date Noted    Positive fecal immunochemical test 02/28/2024    HLA-B27 non-radiographic axial spondyloarthritis (HCC) 01/16/2024    DJD (degenerative joint disease), lumbosacral 01/16/2024    Immunosuppressed status (HCC) 01/16/2024    Increased mucus in stool 01/16/2024       PAST MEDICAL HISTORY:  No past medical history on file.    PAST SURGICAL HISTORY:  No past surgical history on file.    MEDICATIONS:  Current Outpatient Medications   Medication Sig    COSENTYX SENSOREADY  MG/ML Solution Auto-injector     amphetamine-dextroamphetamine (ADDERALL XR) 15 MG XR capsule     RECTIV 0.4 % ointment     meloxicam (MOBIC) 15 MG tablet  (Patient not taking: Reported on 2/28/2024)    Adalimumab (HUMIRA, 2 PEN,) 40 MG/0.4ML Pen-injector Kit Inject 40 mg under the skin every 14 days.       ALLERGIES:   No Known Allergies    IMMUNIZATIONS:  Immunization History   Administered Date(s) Administered    MODERNA SARS-COV-2 VACCINE (12+) 03/08/2021, 04/05/2021       SOCIAL HISTORY:   Social History     Socioeconomic History    Marital status: Single   Tobacco Use    Smoking status: Never    Smokeless tobacco: Never       FAMILY HISTORY:  No family history on file.         Objective     Vital Signs: /70 (BP Location: Left arm, Patient Position: Sitting, BP Cuff Size: Adult)   Pulse 60   Temp 36.6 °C (97.9 °F) (Temporal)   Ht 1.753 m (5' 9\")   Wt 79.4 kg (175 lb 2 oz)   SpO2 98% Body mass index is 25.86 kg/m².    General: Appears well and comfortable  Eyes: No scleral or conjunctival lesions  ENT: No apparent oral or nasal lesions  Head/Neck: No apparent scalp or neck lesions  Cardiovascular: Regular rate and rhythm  Respiratory: Breathing quiet and unlabored  Gastrointestinal: No apparent organomegaly or abdominal masses  Integumentary: No significant cutaneous lesions or dyspigmentation  Musculoskeletal: No significant joint tenderness, periarticular soft " tissue swelling, warmth, erythema, or overt synovitis; no significant restriction in range of motion of joints examined  Neurologic: No focal sensory or motor deficits  Psychiatric: Mood and affect appropriate      LABORATORY RESULTS REVIEWED AND INTERPRETED BY ME:  Lab Results   Component Value Date/Time    CREACTPROT <0.30 01/16/2024 03:13 PM    SEDRATEWES 12 01/16/2024 03:13 PM     Lab Results   Component Value Date/Time    WBC 6.3 01/16/2024 03:13 PM    RBC 4.67 (L) 01/16/2024 03:13 PM    HEMOGLOBIN 15.0 01/16/2024 03:13 PM    HEMATOCRIT 44.8 01/16/2024 03:13 PM    MCV 95.9 01/16/2024 03:13 PM    MCH 32.1 01/16/2024 03:13 PM    MCHC 33.5 01/16/2024 03:13 PM    RDW 42.5 01/16/2024 03:13 PM    PLATELETCT 391 01/16/2024 03:13 PM    MPV 9.9 01/16/2024 03:13 PM    NEUTS 3.07 01/16/2024 03:13 PM    LYMPHOCYTES 40.40 01/16/2024 03:13 PM    MONOCYTES 8.20 01/16/2024 03:13 PM    EOSINOPHILS 1.70 01/16/2024 03:13 PM    BASOPHILS 0.80 01/16/2024 03:13 PM     Lab Results   Component Value Date/Time    ASTSGOT 29 01/16/2024 03:13 PM    ALTSGPT 18 01/16/2024 03:13 PM    ALKPHOSPHAT 73 01/16/2024 03:13 PM    TBILIRUBIN 0.3 01/16/2024 03:13 PM    TOTPROTEIN 7.8 01/16/2024 03:13 PM    ALBUMIN 4.7 01/16/2024 03:13 PM     Lab Results   Component Value Date/Time    SODIUM 141 01/16/2024 03:13 PM    POTASSIUM 3.7 01/16/2024 03:13 PM    CHLORIDE 101 01/16/2024 03:13 PM    CO2 28 01/16/2024 03:13 PM    GLUCOSE 84 01/16/2024 03:13 PM    BUN 15 01/16/2024 03:13 PM    CREATININE 0.84 01/16/2024 03:13 PM    CALCIUM 9.5 01/16/2024 03:13 PM       RADIOLOGY RESULTS REVIEWED AND INTERPRETED BY ME:  No loadable results found in the system. Pertinent non-system results, if applicable, summarized under history above.      All relevant laboratory and imaging results reported on this note were reviewed and interpreted by me.         Assessment & Plan     Johny Candelario is a 33 y.o. male with history and physical as noted above whose presentation  merits the following clinical impressions and recommendations:    1. HLA-B27 non-radiographic axial spondyloarthritis (HCC)  Clinically appears to be very low disease activity well-controlled on the current regimen of Cosentyx. However, given the concern for evolving IBD described below and considering this medication is not on the preferred formulary of his current insurance, switching from Cosentyx to Humira is appropriate.   - CRP QUANTITIVE (NON-CARDIAC); Future  - Sed Rate; Future  - Discontinue Cosentyx 150 mg SC every 2 weeks after finishing the current supply before starting Humira   - Start Humira 40 mg SC every 2 weeks after finishing the remaining supply of Cosentyx as noted above    2. DJD (degenerative joint disease), lumbosacral  Considered an important etiology of his chronic back pain which can be managed supportively.   - CBC WITH DIFFERENTIAL; Future  - Comp Metabolic Panel; Future  - Recommend topical and oral nonopioid analgesics as needed  - Consider referral to pain management if that becomes necessary    3. Positive fecal immunochemical test  Raises concern for IBD in the setting of his positive HLA-B27 given history of stool mucus with blood, positive fecal calprotectin and fecal lactoferrin, hence the switch from Cosentyx to Humira  - CRP QUANTITIVE (NON-CARDIAC); Future  - Sed Rate; Future  - Need to obtain report of pathology from his colonoscopy in 11/2023    4. Immunosuppressed status (HCC)  Presently with no history, physical, or laboratory evidence to suggest significant adverse drug effects or opportunistic infections, but need routine monitoring per guidelines.  - CBC WITH DIFFERENTIAL; Future  - Comp Metabolic Panel; Future  - Need to ascertain age-appropriate vaccines in addition to the ones listed above under immunizations      The above assessment and plan were discussed with the patient who acknowledged understanding of the plan.    FOLLOW-UP: Return in about 3 months (around  5/28/2024) for Short.         Thank you for the opportunity to participate in the care of Johny Candelario.    Ap Garcia MD, MS, FACR  Rheumatologist, [facility] Rheumatology ? [facility]   of Clinical Medicine, Department of Internal Medicine  [facility] ? [facility]

## 2024-02-29 LAB
C TRACH DNA SPEC QL NAA+PROBE: NEGATIVE
N GONORRHOEA DNA SPEC QL NAA+PROBE: NEGATIVE
SPECIMEN SOURCE: NORMAL
TESTOST SERPL-MCNC: 804 NG/DL (ref 175–781)

## 2024-03-01 LAB
BACTERIA UR CULT: NORMAL
SIGNIFICANT IND 70042: NORMAL
SITE SITE: NORMAL
SOURCE SOURCE: NORMAL

## 2024-05-22 ENCOUNTER — TELEMEDICINE (OUTPATIENT)
Dept: RHEUMATOLOGY | Facility: MEDICAL CENTER | Age: 34
End: 2024-05-22
Attending: STUDENT IN AN ORGANIZED HEALTH CARE EDUCATION/TRAINING PROGRAM
Payer: COMMERCIAL

## 2024-05-22 VITALS — WEIGHT: 170 LBS | BODY MASS INDEX: 25.18 KG/M2 | HEIGHT: 69 IN

## 2024-05-22 DIAGNOSIS — R19.5 POSITIVE FECAL IMMUNOCHEMICAL TEST: ICD-10-CM

## 2024-05-22 DIAGNOSIS — M45.A0 NON-RADIOGRAPHIC AXIAL SPONDYLOARTHRITIS (HCC): ICD-10-CM

## 2024-05-22 DIAGNOSIS — M47.817 DJD (DEGENERATIVE JOINT DISEASE), LUMBOSACRAL: ICD-10-CM

## 2024-05-22 DIAGNOSIS — D84.9 IMMUNOSUPPRESSED STATUS (HCC): ICD-10-CM

## 2024-05-22 PROCEDURE — 99214 OFFICE O/P EST MOD 30 MIN: CPT | Mod: 95 | Performed by: STUDENT IN AN ORGANIZED HEALTH CARE EDUCATION/TRAINING PROGRAM

## 2024-05-22 RX ORDER — PAROXETINE 10 MG/1
10 TABLET, FILM COATED ORAL DAILY
COMMUNITY
Start: 2024-05-01

## 2024-05-22 ASSESSMENT — FIBROSIS 4 INDEX: FIB4 SCORE: 0.58

## 2024-05-22 NOTE — PATIENT INSTRUCTIONS
AFTER VISIT INSTRUCTIONS    Below are important information to help you navigate your healthcare needs and help us serve you safely and effectively:  If laboratory tests and/or imaging studies were ordered, remember to go get them done as instructed.  If new prescriptions and/or refills were sent, remember to go pick them up from your local pharmacy, or call the specialty pharmacy to request shipment.  Always take your prescription medications exactly as prescribed unless instructed otherwise.  Note that antirheumatic drugs and steroids are immunosuppressive which means they increase your risk of infections and have multiple potential adverse effects on various organ systems in your body, though many of them are uncommon.  It is important that you are up-to-date on age-appropriate immunizations, particularly shingles and bacterial/viral pneumonia vaccines, which you can request from me or your primary care provider.  Be sure to read the drug package inserts to learn about the potential side effects of your medications before you start taking them.  If you experience any significant drug side effects, stop taking the medication and notify me promptly, and depending on the severity of the side effects, consider going to an urgent care or emergency department for immediate attention.  If there are significant findings on your lab tests and imaging studies that warrant further action, I will notify you with explanations via [employer] [email or web address] or phone call, otherwise you can view them on Simphatic and let me know if you have any questions.  Note that Simphatic messages are typically read during office hours and may take 1-7 business days before a response depending on the urgency of the situation and how busy my clinic schedule is.  In general, Simphatic messaging is for non-urgent matters that do not require immediate attention, so for urgent matters that cannot wait, you are advised to go to an urgent care.  You are granted [place]  access to my documentation of your visit and are encouraged to read my note which details my assessment and plan for your condition.  To learn more about your condition and rheumatic diseases evaluated and treated by rheumatologists, as well as gain access to many helpful resources about these diseases, visit our website: [email or web address].  To properly dispose of your unused, unwanted, or residual medications/supplies, visit the Drug Enforcement Administration website to locate your closest drop-off location: www.jami.gov/everyday-takeback-day.

## 2024-05-22 NOTE — PROGRESS NOTES
[facility] RHEUMATOLOGY  [address], [address], [place] [address]  Phone: [phone] ? Fax: [phone]  [facility]/Health-Services/Rheumatology    VIRTUAL VIDEO FOLLOW-UP VISIT NOTE      This evaluation was conducted via Zoom using secure and encrypted videoconferencing technology for virtual visit. The patient was in their home in the [facility]. The patient's identity was confirmed and verbal consent was obtained for this encounter.      DATE OF SERVICE: 05/22/2024         Subjective     PRIMARY CARE PRACTITIONER:  Julian Almazan M.D.  [address]  [email or web address] [id number]    PATIENT IDENTIFICATION:  Johny Candelario  [address] [facility] [address]  [email or web address] [id number]    YOB: 1990    MEDICAL RECORD NUMBER: [id number]         CHIEF COMPLAINT:   Chief Complaint   Patient presents with    Follow-Up     HLA-B27 non-radiographic axial spondyloarthritis (HCC)       RHEUMATOLOGIC HISTORY:  Johny Candelario is a 33 y.o. male with pertinent history notable for HLA-B27 positive non-radiographic axial spondyloarthritis diagnosed in 2020 (reportedly symptomatic from 4/2020), DJD of lumbosacral spine, and serologic autoimmunity (positive CAMILLE). Previously under the care of a rheumatologist in [place] (Dr. Loretta Alva at [facility] in [place], [place]), he relocated to [place] in 6/2023 and initially presented on 1/16/24 to establish care for continued evaluation and management of his condition. Reported onset of symptoms in 4/2020 with chronic lower back pain with prolonged morning stiffness that improved with activity which prompted his diagnostic evaluation at the time. Following his spondyloarthritis diagnosis, he was initially started on Humira which he took for only 1 dose before being switched to Cosentyx due to insurance issues. He had been doing quite well since then with no significant back pain or stiffness and felt that Cosentyx had been very helpful for his  condition. However, he reported a history of episodic yeast infection of the anus with some fissures for which he had been using antifungal and topical anesthetic which provided some relief. He had also been noticing increased mucus in his stool and some blood after wiping which he thought was from the anal fissure. Given a family history of colon cancer in his father, he reportedly underwent colonoscopy in 11/2023 by a colorectal surgeon, Dr. Kyle Raymond in [place], [place] (showed colonic inflammation per result below) and later established with gastroenterology in [place]. Reported other aspects of his symptoms and medical history as noted on the initial visit questionnaire.    Pertinent treatments: Gabapentin 100 mg twice daily PRN (unclear benefit), meloxicam 15>7.5 mg QD>BID PRN(effective), Humira (stopped after 1 dose due to insurance issues), Cosentyx 150 mg SC every 2 weeks (2020-2/2024, partially effective), Humira 40 mg SC every 2 weeks (ordered 1/16/24-present).    Pertinent positive labs: Positive HLA-B27 and positive CAMILLE 1:160 with negative reflex panel (in 8/2021 per former rheumatologist's note); positive fecal lactoferrin and fecal calprotectin 122 (in 2/2024).    Pertinent negative labs: Negative RF, anti-CCP, C3 and C4 (in 8/2021), SPEP, HBV, and QTB (in 10/2022), ASCA, CRP, ESR, eGFR, creatinine, LFTs, and CBC (in 1/2024).    Colonoscopy with biopsy (in 11/2023 at [facility]): Colonic/rectal mucosa with increase in surface intraepithelial neutrophils and eosinophils, and lymphoid aggregates in lamina propria.    Pertinent XR imaging (in 10/2022 reports on former rheumatologist's note): Lumbar spine with facet arthrosis from L3 through S1, mild at L3-L4, mild-to-moderate at L4-L5, and moderate-to-severe at L5-S1; spondylitic ridging of the posterior endplates at L5-S1 suspicious for spondylolysis with spondylolisthesis. Thoracic spine and SI joints with no evidence of degenerative or  inflammatory arthropathy.      INTERVAL HISTORY:  Reports interval history as noted on the questionnaire below or scanned under media tab.  Notably waxing/waning pain/stiffness in the neck/upper and lower back as he has not been taking meloxicam.  Recently underwent colonoscopy in 3/2024 at [facility] which was reportedly unremarkable.    REVIEW OF SYSTEMS:  Except as noted in the history above, relevant review of systems with emphasis on autoimmune rheumatic diseases was otherwise negative.      ACTIVE PROBLEM LIST:  Patient Active Problem List    Diagnosis Date Noted    Positive fecal immunochemical test 02/28/2024    HLA-B27 non-radiographic axial spondyloarthritis (HCC) 01/16/2024    DJD (degenerative joint disease), lumbosacral 01/16/2024    Immunosuppressed status (HCC) 01/16/2024    Increased mucus in stool 01/16/2024       PAST MEDICAL HISTORY:  History reviewed. No pertinent past medical history.    PAST SURGICAL HISTORY:  History reviewed. No pertinent surgical history.    SOCIAL HISTORY:  Social History     Socioeconomic History    Marital status: Single     Spouse name: Not on file    Number of children: Not on file    Years of education: Not on file    Highest education level: Not on file   Occupational History    Not on file   Tobacco Use    Smoking status: Never    Smokeless tobacco: Never   Substance and Sexual Activity    Alcohol use: Not on file    Drug use: Not on file    Sexual activity: Not on file   Other Topics Concern    Not on file   Social History Narrative    Not on file     Social Determinants of Health     Financial Resource Strain: Not on file   Food Insecurity: Not on file   Transportation Needs: Not on file   Physical Activity: Not on file   Stress: Not on file   Social Connections: Not on file   Intimate Partner Violence: Not on file   Housing Stability: Not on file       FAMILY HISTORY:  History reviewed. No pertinent family history.    MEDICATIONS:  Current Outpatient  "Medications   Medication Sig    PARoxetine (PAXIL) 10 MG Tab Take 10 mg by mouth every day.    amphetamine-dextroamphetamine (ADDERALL XR) 15 MG XR capsule     Adalimumab (HUMIRA, 2 PEN,) 40 MG/0.4ML Pen-injector Kit Inject 40 mg under the skin every 14 days.       ALLERGIES:   No Known Allergies    IMMUNIZATIONS:  Immunization History   Administered Date(s) Administered    MODERNA SARS-COV-2 VACCINE (12+) 03/08/2021, 04/05/2021            Objective     Vital Signs: Ht 1.753 m (5' 9\")   Wt 77.1 kg (170 lb) Body mass index is 25.1 kg/m².    General: Appears well and comfortable  Eyes: Not applicable  ENT: Not applicable  Head/Neck: Not applicable  Cardiovascular: Not applicable  Respiratory: Breathing quiet and unlabored  Gastrointestinal: Not applicable  Integumentary: Not applicable  Musculoskeletal: Not applicable  Neurologic: Not applicable  Psychiatric: Mood and affect appropriate      LABORATORY RESULTS REVIEWED AND INTERPRETED BY ME:  Lab Results   Component Value Date/Time    CREACTPROT <0.30 01/16/2024 03:13 PM    SEDRATEWES 12 01/16/2024 03:13 PM     Lab Results   Component Value Date/Time    WBC 6.3 01/16/2024 03:13 PM    RBC 4.67 (L) 01/16/2024 03:13 PM    HEMOGLOBIN 15.0 01/16/2024 03:13 PM    HEMATOCRIT 44.8 01/16/2024 03:13 PM    MCV 95.9 01/16/2024 03:13 PM    MCH 32.1 01/16/2024 03:13 PM    MCHC 33.5 01/16/2024 03:13 PM    RDW 42.5 01/16/2024 03:13 PM    PLATELETCT 391 01/16/2024 03:13 PM    MPV 9.9 01/16/2024 03:13 PM    NEUTS 3.07 01/16/2024 03:13 PM    LYMPHOCYTES 40.40 01/16/2024 03:13 PM    MONOCYTES 8.20 01/16/2024 03:13 PM    EOSINOPHILS 1.70 01/16/2024 03:13 PM    BASOPHILS 0.80 01/16/2024 03:13 PM     Lab Results   Component Value Date/Time    ASTSGOT 29 01/16/2024 03:13 PM    ALTSGPT 18 01/16/2024 03:13 PM    ALKPHOSPHAT 73 01/16/2024 03:13 PM    TBILIRUBIN 0.3 01/16/2024 03:13 PM    TOTPROTEIN 7.8 01/16/2024 03:13 PM    ALBUMIN 4.7 01/16/2024 03:13 PM     Lab Results   Component Value " Date/Time    SODIUM 141 01/16/2024 03:13 PM    POTASSIUM 3.7 01/16/2024 03:13 PM    CHLORIDE 101 01/16/2024 03:13 PM    CO2 28 01/16/2024 03:13 PM    GLUCOSE 84 01/16/2024 03:13 PM    BUN 15 01/16/2024 03:13 PM    CREATININE 0.84 01/16/2024 03:13 PM    CALCIUM 9.5 01/16/2024 03:13 PM       RADIOLOGY RESULTS REVIEWED AND INTERPRETED BY ME:  No loadable results found in the system. Pertinent non-system results, if applicable, summarized under history above.      All relevant laboratory and imaging results reported on this note were reviewed and interpreted by me.         Assessment & Plan     Jhony Candelario is a 33 y.o. male with history as noted above whose presentation merits the following clinical impressions and recommendations:    1. HLA-B27 non-radiographic axial spondyloarthritis (HCC)  Clinically appears to be responding well on the current regimen of Humira which was switched from Cosentyx given the previous concern for evolving IBD described below. That said, reasonable at this point to resume NSAID therapy with meloxicam to augment his treatment.  - CRP and ESR (previously ordered)  - Continue Humira 40 mg SC every 2 weeks after finishing the remaining supply of Cosentyx as noted above  - Resume meloxicam 15>7.5 mg QD>BID as needed     2. DJD (degenerative joint disease), lumbosacral  Considered an important etiology of his chronic back pain which can be managed supportively.   - Tylenol Arthritis, oral NSAIDs, and targeted physical therapy as deemed appropriate  - Consider referral to pain management if that becomes necessary     3. Positive fecal immunochemical test  Raises concern for IBD in the setting of his positive HLA-B27 given history of stool mucus with blood, positive fecal calprotectin and fecal lactoferrin, hence the switch from Cosentyx to Humira  - CRP and ESR (previously ordered)  - Need to obtain results from colonoscopy in 3/2024 at [facility]  - Consider repeat colonoscopy  within 3-5 years     4. Immunosuppressed status (HCC)  Presently with no history, physical, or laboratory evidence to suggest significant adverse drug effects or opportunistic infections, but need routine monitoring per guidelines.  - CBC and CMP (previously ordered)  - Need to ascertain age-appropriate vaccines in addition to the ones listed above under immunizations       The above assessment and plan were discussed with the patient who acknowledged understanding of the plan.    FOLLOW-UP: Return in about 3 months (around 8/22/2024) for Short.         Thank you for the opportunity to participate in the care of Johny Candelario.    Ap Garcia MD, MS, FACR  Rheumatologist, [facility] Rheumatology, [facility]   of Clinical Medicine, Department of Internal Medicine  [facility] [employer]

## 2024-06-26 ENCOUNTER — APPOINTMENT (OUTPATIENT)
Dept: RHEUMATOLOGY | Facility: MEDICAL CENTER | Age: 34
End: 2024-06-26
Attending: STUDENT IN AN ORGANIZED HEALTH CARE EDUCATION/TRAINING PROGRAM
Payer: COMMERCIAL

## 2024-06-26 ASSESSMENT — RHEUMATOLOGY FOLLOW-UP QUESTIONNAIRE
MARK ALL THE AREAS OF PAIN: 103184641
DURATION: 30-60 MINS
CHARACTERISTIC: BETTER WITH ACTIVITY
ALLEVIATING_FACTORS: TIME
CHIEF_COMPLAINT: RASH
RATE_1TO10: 3
ANXIETY: Y

## 2024-06-27 ENCOUNTER — TELEMEDICINE (OUTPATIENT)
Dept: RHEUMATOLOGY | Facility: MEDICAL CENTER | Age: 34
End: 2024-06-27
Attending: STUDENT IN AN ORGANIZED HEALTH CARE EDUCATION/TRAINING PROGRAM
Payer: COMMERCIAL

## 2024-06-27 VITALS — BODY MASS INDEX: 25.92 KG/M2 | HEIGHT: 69 IN | WEIGHT: 175 LBS

## 2024-06-27 DIAGNOSIS — R21 RASH AND NONSPECIFIC SKIN ERUPTION: ICD-10-CM

## 2024-06-27 DIAGNOSIS — R19.5 POSITIVE FECAL IMMUNOCHEMICAL TEST: ICD-10-CM

## 2024-06-27 DIAGNOSIS — D84.9 IMMUNOSUPPRESSED STATUS (HCC): ICD-10-CM

## 2024-06-27 DIAGNOSIS — M45.A0 NON-RADIOGRAPHIC AXIAL SPONDYLOARTHRITIS (HCC): ICD-10-CM

## 2024-06-27 DIAGNOSIS — M47.817 DJD (DEGENERATIVE JOINT DISEASE), LUMBOSACRAL: ICD-10-CM

## 2024-06-27 RX ORDER — MELOXICAM 15 MG/1
7.5 TABLET ORAL
Qty: 30 TABLET | Refills: 5 | Status: SHIPPED | OUTPATIENT
Start: 2024-06-27

## 2024-06-27 ASSESSMENT — FIBROSIS 4 INDEX: FIB4 SCORE: 0.59

## 2024-06-27 NOTE — PROGRESS NOTES
[facility] RHEUMATOLOGY  [address], [place] [address]  Phone: [phone] ? Fax: [phone]  [facility]/Health-Services/Rheumatology    VIRTUAL VIDEO FOLLOW-UP VISIT NOTE      This evaluation was conducted via Zoom using secure and encrypted videoconferencing technology for virtual visit. The patient was in their home in the [facility]. The patient's identity was confirmed and verbal consent was obtained for this encounter.      DATE OF SERVICE: 06/27/2024         Subjective     PRIMARY CARE PRACTITIONER:  Julian Almazan M.D.  [address]  [place] [address]    PATIENT IDENTIFICATION:  Johny Candelario  [address]  [place] [address]    YOB: 1990    MEDICAL RECORD NUMBER: [id number]         CHIEF COMPLAINT:   Chief Complaint   Patient presents with    Follow-Up     HLA-B27 non-radiographic axial spondyloarthritis (HCC)       RHEUMATOLOGIC HISTORY:  Johny Candelario is a 33 y.o. male with pertinent history notable for HLA-B27 positive non-radiographic axial spondyloarthritis diagnosed in 2020 (reportedly symptomatic from 4/2020), DJD of lumbosacral spine, and serologic autoimmunity (positive CAMILLE). Previously under the care of a rheumatologist in [place] (Dr. Loretta Alva at [facility] in [place], [place]), he relocated to [place] in 6/2023 and initially presented on 1/16/24 to establish care for continued evaluation and management of his condition. Reported onset of symptoms in 4/2020 with chronic lower back pain with prolonged morning stiffness that improved with activity which prompted his diagnostic evaluation at the time. Following his spondyloarthritis diagnosis, he was initially started on Humira which he took for only 1 dose before being switched to Cosentyx due to insurance issues. He had been doing quite well since then with no significant back pain or stiffness and felt that Cosentyx had been very helpful for his  condition. However, he reported a history of episodic yeast infection of the anus with some fissures for which he had been using antifungal and topical anesthetic which provided some relief. He had also been noticing increased mucus in his stool and some blood after wiping which he thought was from the anal fissure. Given a family history of colon cancer in his father, he reportedly underwent colonoscopy in 11/2023 by a colorectal surgeon, Dr. Kyle Raymond in [place], [place] (showed colonic inflammation per result below) and later established with gastroenterology in [place]. Reported other aspects of his symptoms and medical history as noted on the initial visit questionnaire.    Pertinent treatments: Gabapentin 100 mg twice daily PRN (unclear benefit), meloxicam 15>7.5 mg QD>BID PRN(effective), Humira (stopped after 1 dose due to insurance issues), Cosentyx 150 mg SC every 2 weeks (2020-2/2024, partially effective), Humira 40 mg SC every 2 weeks (ordered 1/16/24-present).    Pertinent positive labs: Positive HLA-B27 and positive CAMILLE 1:160 with negative reflex panel (in 8/2021 per former rheumatologist's note); positive fecal lactoferrin and fecal calprotectin 122 (in 2/2024).    Pertinent negative labs: Negative RF, anti-CCP, C3 and C4 (in 8/2021), SPEP, HBV, and QTB (in 10/2022), ASCA, CRP, ESR, eGFR, creatinine, LFTs, and CBC (in 1/2024).    Colonoscopy with biopsy (in 11/2023 at [facility]): Colonic/rectal mucosa with increase in surface intraepithelial neutrophils and eosinophils, and lymphoid aggregates in lamina propria.    Pertinent XR imaging (in 10/2022 reports on former rheumatologist's note): Lumbar spine with facet arthrosis from L3 through S1, mild at L3-L4, mild-to-moderate at L4-L5, and moderate-to-severe at L5-S1; spondylitic ridging of the posterior endplates at L5-S1 suspicious for spondylolysis with spondylolisthesis. Thoracic spine and SI joints with no evidence of degenerative or  inflammatory arthropathy.      INTERVAL HISTORY:  Reports interval history as noted on the questionnaire below or scanned under media tab.  Notably since a month ago, new onset right itchy rashes around armpits/upper arms and groin/hips/thighs.  He has tried to manage with over-the-counter antifungal cream with no improvement.  Waxing/waning pain/stiffness in the neck/upper and lower back but not really needing to take meloxicam.  Underwent colonoscopy in 3/2024 at [facility] which was reportedly unremarkable.    REVIEW OF SYSTEMS:  Except as noted in the history above, relevant review of systems with emphasis on autoimmune rheumatic diseases was otherwise negative.      ACTIVE PROBLEM LIST:  Patient Active Problem List    Diagnosis Date Noted    Rash and nonspecific skin eruption 06/27/2024    Positive fecal immunochemical test 02/28/2024    HLA-B27 non-radiographic axial spondyloarthritis (HCC) 01/16/2024    DJD (degenerative joint disease), lumbosacral 01/16/2024    Immunosuppressed status (HCC) 01/16/2024    Increased mucus in stool 01/16/2024       PAST MEDICAL HISTORY:  History reviewed. No pertinent past medical history.    PAST SURGICAL HISTORY:  History reviewed. No pertinent surgical history.    SOCIAL HISTORY:  Social History     Socioeconomic History    Marital status: Single     Spouse name: Not on file    Number of children: Not on file    Years of education: Not on file    Highest education level: Not on file   Occupational History    Not on file   Tobacco Use    Smoking status: Never    Smokeless tobacco: Never    Tobacco comments:     Does use nicotine patches    Vaping Use    Vaping status: Never Used   Substance and Sexual Activity    Alcohol use: Yes     Comment: moderate use    Drug use: Never    Sexual activity: Not on file   Other Topics Concern    Not on file   Social History Narrative    Not on file     Social Determinants of Health     Financial Resource Strain: Not on file   Food  "Insecurity: Not on file   Transportation Needs: Not on file   Physical Activity: Not on file   Stress: Not on file   Social Connections: Not on file   Intimate Partner Violence: Not on file   Housing Stability: Not on file       FAMILY HISTORY:  History reviewed. No pertinent family history.    MEDICATIONS:  Current Outpatient Medications   Medication Sig    meloxicam (MOBIC) 15 MG tablet Take 0.5 Tablets by mouth 2 times daily with meals as needed for Inflammation.    PARoxetine (PAXIL) 10 MG Tab Take 10 mg by mouth every day.    amphetamine-dextroamphetamine (ADDERALL XR) 15 MG XR capsule     Adalimumab (HUMIRA, 2 PEN,) 40 MG/0.4ML Pen-injector Kit Inject 40 mg under the skin every 14 days.       ALLERGIES:   No Known Allergies    IMMUNIZATIONS:  Immunization History   Administered Date(s) Administered    MODERNA SARS-COV-2 VACCINE (12+) 03/08/2021, 04/05/2021            Objective     Vital Signs: Ht 1.753 m (5' 9\")   Wt 79.4 kg (175 lb) Body mass index is 25.84 kg/m².    General: Appears well and comfortable  Eyes: Not applicable  ENT: Not applicable  Head/Neck: Not applicable  Cardiovascular: Not applicable  Respiratory: Breathing quiet and unlabored  Gastrointestinal: Not applicable  Integumentary: Erythematous plaque-like rashes on armpits/upper arms and groin/hips/thighs (shown to me via camera)  Musculoskeletal: Not applicable  Neurologic: Not applicable  Psychiatric: Mood and affect appropriate      LABORATORY RESULTS REVIEWED AND INTERPRETED BY ME:  Lab Results   Component Value Date/Time    CREACTPROT <0.30 01/16/2024 03:13 PM    SEDRATEWES 12 01/16/2024 03:13 PM     Lab Results   Component Value Date/Time    WBC 6.3 01/16/2024 03:13 PM    RBC 4.67 (L) 01/16/2024 03:13 PM    HEMOGLOBIN 15.0 01/16/2024 03:13 PM    HEMATOCRIT 44.8 01/16/2024 03:13 PM    MCV 95.9 01/16/2024 03:13 PM    MCH 32.1 01/16/2024 03:13 PM    MCHC 33.5 01/16/2024 03:13 PM    RDW 42.5 01/16/2024 03:13 PM    PLATELETCT 391 01/16/2024 " 03:13 PM    MPV 9.9 01/16/2024 03:13 PM    NEUTS 3.07 01/16/2024 03:13 PM    LYMPHOCYTES 40.40 01/16/2024 03:13 PM    MONOCYTES 8.20 01/16/2024 03:13 PM    EOSINOPHILS 1.70 01/16/2024 03:13 PM    BASOPHILS 0.80 01/16/2024 03:13 PM     Lab Results   Component Value Date/Time    ASTSGOT 29 01/16/2024 03:13 PM    ALTSGPT 18 01/16/2024 03:13 PM    ALKPHOSPHAT 73 01/16/2024 03:13 PM    TBILIRUBIN 0.3 01/16/2024 03:13 PM    TOTPROTEIN 7.8 01/16/2024 03:13 PM    ALBUMIN 4.7 01/16/2024 03:13 PM     Lab Results   Component Value Date/Time    SODIUM 141 01/16/2024 03:13 PM    POTASSIUM 3.7 01/16/2024 03:13 PM    CHLORIDE 101 01/16/2024 03:13 PM    CO2 28 01/16/2024 03:13 PM    GLUCOSE 84 01/16/2024 03:13 PM    BUN 15 01/16/2024 03:13 PM    CREATININE 0.84 01/16/2024 03:13 PM    CALCIUM 9.5 01/16/2024 03:13 PM       RADIOLOGY RESULTS REVIEWED AND INTERPRETED BY ME:  No loadable results found in the system. Pertinent non-system results, if applicable, summarized under history above.      All relevant laboratory and imaging results reported on this note were reviewed and interpreted by me.         Assessment & Plan     Johny Candelario is a 33 y.o. male with history as noted above whose presentation merits the following clinical impressions and recommendations:    1. Rash and nonspecific skin eruption  Raises concern for possible drug-induced psoriasis secondary to Humira use versus contact dermatitis. In any case, reasonable to refer to dermatology for further evaluation with possible skin punch biopsy. In the meantime, would recommend holding off on Humira for now.  - Referral to Dermatology  - Hold off on Humira for now    2. HLA-B27 non-radiographic axial spondyloarthritis (HCC)  Clinically appears to be responding well on the current regimen of Humira, but given the concern for drug-induced psoriasis, need to hold off on Humira for now as above and manage with NSAIDs alone.  - CRP and ESR (previously ordered)  -  meloxicam (MOBIC) 15 MG tablet; Take 0.5 Tablets by mouth 2 times daily with meals as needed for Inflammation.  Dispense: 30 Tablet; Refill: 5  - Consider sulfasalazine     3. DJD (degenerative joint disease), lumbosacral  Considered an important etiology of his chronic back pain which can be managed supportively.   - Tylenol Arthritis, oral NSAIDs, and targeted physical therapy as deemed appropriate  - Consider referral to pain management if that becomes necessary     4. Positive fecal immunochemical test  Raises concern for IBD in the setting of his positive HLA-B27 given history of stool mucus with blood, positive fecal calprotectin and fecal lactoferrin, hence the switch from Cosentyx to Humira.  - CRP and ESR (previously ordered)  - Consider sulfasalazine as above  - Need to obtain results from colonoscopy in 3/2024 at [facility]  - Consider repeat colonoscopy within 3-5 years     5. Immunosuppressed status (HCC)  Presently with no history, physical, or laboratory evidence to suggest significant adverse drug effects or opportunistic infections, but need routine monitoring per guidelines.  - CBC and CMP (previously ordered)  - Need to ascertain age-appropriate vaccines in addition to the ones listed above under immunizations      The above assessment and plan were discussed with the patient who acknowledged understanding of the plan.    FOLLOW-UP: Return in about 7 weeks (around 8/13/2024) for Short.         Thank you for the opportunity to participate in the care of Johny Candelario.    Ap Garcia MD, MS, FACR  Rheumatologist, [facility] Rheumatology, [facility]   of Clinical Medicine, Department of Internal Medicine  [facility]

## 2024-06-27 NOTE — PATIENT INSTRUCTIONS
[facility]    Below are important guidelines to help you navigate your health care needs and assist us in caring for you safely and effectively. We encourage you to carefully read and understand this information and adhere to them accordingly.    [employer] Messaging and Phone Calls:  Diagnosis and Treatment - For a detailed explanation of your condition and treatment plan from today's visit, refer to the visit note on [employer] via the following steps:  Log in to [employer] and click on “Visits” at the top.  Scroll down to “Past Visits” under Appointments.  Click on “View Notes” under the appropriate visit date.  Questions or Concerns - MyChart messaging is for non-urgent matters that do not require immediate attention and should be brief with no more than two questions or concerns. If you have multiple questions or concerns, we ask that you schedule an appointment to have them properly addressed.  Response to Messages - [employer] messages are addressed throughout the week depending on clinical availability, so we ask that you allow up to one week for a response.  Phone Calls and Voicemails - Phone calls and voicemail messages are reserved for time-sensitive matters that cannot wait to be addressed via [employer]. We ask that you refrain from calling the office multiple times or leaving multiple voicemails regarding the same issue as doing so may lead to delays in response time.  Urgent Issues - For urgent medical matters or medical emergencies that cannot wait, you are advised to go to your nearest Urgent Care or Emergency Department for immediate attention.    Laboratory Tests and Imaging Studies:  Future Lab and Imaging Orders - We ask that you get your lab tests and imaging studies done no later than one week before your follow-up visit unless instructed otherwise.  Results Communication - You may see some test results marked as “abnormal” that are not necessarily significant or concerning. If  there are significant abnormalities on your test results that warrant further action, you will be notified via MyChart or phone call, otherwise they will be addressed at your follow-up visit.    Prescriptions and Refill Requests:  General Prescriptions (e.g. prednisone, hydroxychloroquine, leflunomide, methotrexate, etc.) - These are sent to Retail Pharmacies, so all refill requests of these medications should be directed to your local pharmacy.  Specialty Prescriptions (e.g. Enbrel, Humira, Cosentyx, Xeljanz, etc.) - These are sent to Specialty Pharmacies, so all refill requests of these medications should be directed to your designated specialty pharmacy.  Infusion Prescriptions (e.g. Remicade, Simponi Aria, Rituxan, Saphnelo, etc.) - These are sent to Outpatient Infusion Centers, so all scheduling requests of these medications should be directed to your local infusion center.    Medication Risks and Adverse Effects:  Immunosuppressed Status - Steroids and antirheumatic drugs are immunosuppressants, so they increase the risk of infections and can have side effects on various organ systems in your body, though most of them are uncommon.  Potential Side Effects - Be sure to read the drug package inserts to learn about the potential side effects of your medications before you start taking them and take them exactly as prescribed unless instructed otherwise.  In Case of Side Effects - If you experience any significant side effects, stop taking the medication immediately and promptly notify the prescriber. Depending on the severity of the side effects, consider going to an Urgent Care or Emergency Department for immediate attention.    Immunizations and Health Screening:  Vaccinations - If you are on immunosuppressive therapy, it is important that you are up to date on age-appropriate immunizations, particularly shingles and pneumonia vaccines, which you can request from your primary care provider or from us at your  next appointment.  Screening Tests - It is also important that you are up to date on age-appropriate screening tests, such as pap smear, mammography, and colonoscopy, which you can request from your primary care provider.    Educational and Supportive Resources:  [facility] ([email or web address]) - Visit our website to learn more about your condition and other rheumatic diseases, and gain access to many helpful resources for them.  Disposal of Old Medications (www.jami.gov/everyday-takeback-day) - Visit the Drug Enforcement Administration website to find a nearby location where you can properly dispose of old medications you no longer need.  Disposal of Used [place] (www.safeneedledisposal.org) - Visit the Safe Needle Disposal Organization website to find a nearby location where you can properly dispose of used needles from your injectable medications.

## 2024-07-12 DIAGNOSIS — R21 RASH AND NONSPECIFIC SKIN ERUPTION: ICD-10-CM

## 2024-08-13 ENCOUNTER — HOSPITAL ENCOUNTER (OUTPATIENT)
Dept: LAB | Facility: MEDICAL CENTER | Age: 34
End: 2024-08-13
Attending: STUDENT IN AN ORGANIZED HEALTH CARE EDUCATION/TRAINING PROGRAM
Payer: COMMERCIAL

## 2024-08-13 ENCOUNTER — OFFICE VISIT (OUTPATIENT)
Dept: RHEUMATOLOGY | Facility: MEDICAL CENTER | Age: 34
End: 2024-08-13
Attending: STUDENT IN AN ORGANIZED HEALTH CARE EDUCATION/TRAINING PROGRAM
Payer: COMMERCIAL

## 2024-08-13 VITALS
SYSTOLIC BLOOD PRESSURE: 118 MMHG | BODY MASS INDEX: 25.36 KG/M2 | TEMPERATURE: 97.9 F | DIASTOLIC BLOOD PRESSURE: 82 MMHG | OXYGEN SATURATION: 95 % | HEIGHT: 69 IN | HEART RATE: 68 BPM | WEIGHT: 171.25 LBS

## 2024-08-13 DIAGNOSIS — M45.A0 NON-RADIOGRAPHIC AXIAL SPONDYLOARTHRITIS (HCC): ICD-10-CM

## 2024-08-13 DIAGNOSIS — R19.5 POSITIVE FECAL IMMUNOCHEMICAL TEST: ICD-10-CM

## 2024-08-13 DIAGNOSIS — D84.9 IMMUNOSUPPRESSED STATUS (HCC): ICD-10-CM

## 2024-08-13 DIAGNOSIS — M47.817 DJD (DEGENERATIVE JOINT DISEASE), LUMBOSACRAL: ICD-10-CM

## 2024-08-13 DIAGNOSIS — R21 RASH AND NONSPECIFIC SKIN ERUPTION: ICD-10-CM

## 2024-08-13 LAB
ALBUMIN SERPL BCP-MCNC: 4.3 G/DL (ref 3.2–4.9)
ALBUMIN/GLOB SERPL: 1.5 G/DL
ALP SERPL-CCNC: 80 U/L (ref 30–99)
ALT SERPL-CCNC: 16 U/L (ref 2–50)
ANION GAP SERPL CALC-SCNC: 12 MMOL/L (ref 7–16)
AST SERPL-CCNC: 21 U/L (ref 12–45)
BASOPHILS # BLD AUTO: 0.8 % (ref 0–1.8)
BASOPHILS # BLD: 0.05 K/UL (ref 0–0.12)
BILIRUB SERPL-MCNC: 0.6 MG/DL (ref 0.1–1.5)
BUN SERPL-MCNC: 20 MG/DL (ref 8–22)
CALCIUM ALBUM COR SERPL-MCNC: 9.1 MG/DL (ref 8.5–10.5)
CALCIUM SERPL-MCNC: 9.3 MG/DL (ref 8.5–10.5)
CHLORIDE SERPL-SCNC: 99 MMOL/L (ref 96–112)
CO2 SERPL-SCNC: 25 MMOL/L (ref 20–33)
CREAT SERPL-MCNC: 1.07 MG/DL (ref 0.5–1.4)
CRP SERPL HS-MCNC: <0.3 MG/DL (ref 0–0.75)
EOSINOPHIL # BLD AUTO: 0.45 K/UL (ref 0–0.51)
EOSINOPHIL NFR BLD: 7.4 % (ref 0–6.9)
ERYTHROCYTE [DISTWIDTH] IN BLOOD BY AUTOMATED COUNT: 42.4 FL (ref 35.9–50)
ERYTHROCYTE [SEDIMENTATION RATE] IN BLOOD BY WESTERGREN METHOD: 15 MM/HOUR (ref 0–20)
GFR SERPLBLD CREATININE-BSD FMLA CKD-EPI: 93 ML/MIN/1.73 M 2
GLOBULIN SER CALC-MCNC: 2.9 G/DL (ref 1.9–3.5)
GLUCOSE SERPL-MCNC: 90 MG/DL (ref 65–99)
HCT VFR BLD AUTO: 45.8 % (ref 42–52)
HGB BLD-MCNC: 15.4 G/DL (ref 14–18)
IMM GRANULOCYTES # BLD AUTO: 0.02 K/UL (ref 0–0.11)
IMM GRANULOCYTES NFR BLD AUTO: 0.3 % (ref 0–0.9)
LYMPHOCYTES # BLD AUTO: 2.99 K/UL (ref 1–4.8)
LYMPHOCYTES NFR BLD: 49 % (ref 22–41)
MCH RBC QN AUTO: 32.6 PG (ref 27–33)
MCHC RBC AUTO-ENTMCNC: 33.6 G/DL (ref 32.3–36.5)
MCV RBC AUTO: 97 FL (ref 81.4–97.8)
MONOCYTES # BLD AUTO: 0.47 K/UL (ref 0–0.85)
MONOCYTES NFR BLD AUTO: 7.7 % (ref 0–13.4)
NEUTROPHILS # BLD AUTO: 2.12 K/UL (ref 1.82–7.42)
NEUTROPHILS NFR BLD: 34.8 % (ref 44–72)
NRBC # BLD AUTO: 0 K/UL
NRBC BLD-RTO: 0 /100 WBC (ref 0–0.2)
PLATELET # BLD AUTO: 319 K/UL (ref 164–446)
PMV BLD AUTO: 9.9 FL (ref 9–12.9)
POTASSIUM SERPL-SCNC: 4.7 MMOL/L (ref 3.6–5.5)
PROT SERPL-MCNC: 7.2 G/DL (ref 6–8.2)
RBC # BLD AUTO: 4.72 M/UL (ref 4.7–6.1)
SODIUM SERPL-SCNC: 136 MMOL/L (ref 135–145)
WBC # BLD AUTO: 6.1 K/UL (ref 4.8–10.8)

## 2024-08-13 PROCEDURE — 99212 OFFICE O/P EST SF 10 MIN: CPT | Performed by: STUDENT IN AN ORGANIZED HEALTH CARE EDUCATION/TRAINING PROGRAM

## 2024-08-13 PROCEDURE — 86140 C-REACTIVE PROTEIN: CPT

## 2024-08-13 PROCEDURE — 85025 COMPLETE CBC W/AUTO DIFF WBC: CPT

## 2024-08-13 PROCEDURE — 82397 CHEMILUMINESCENT ASSAY: CPT

## 2024-08-13 PROCEDURE — 3074F SYST BP LT 130 MM HG: CPT | Performed by: STUDENT IN AN ORGANIZED HEALTH CARE EDUCATION/TRAINING PROGRAM

## 2024-08-13 PROCEDURE — 3079F DIAST BP 80-89 MM HG: CPT | Performed by: STUDENT IN AN ORGANIZED HEALTH CARE EDUCATION/TRAINING PROGRAM

## 2024-08-13 PROCEDURE — 99214 OFFICE O/P EST MOD 30 MIN: CPT | Performed by: STUDENT IN AN ORGANIZED HEALTH CARE EDUCATION/TRAINING PROGRAM

## 2024-08-13 PROCEDURE — 80145 DRUG ASSAY ADALIMUMAB: CPT

## 2024-08-13 PROCEDURE — 85652 RBC SED RATE AUTOMATED: CPT

## 2024-08-13 PROCEDURE — 80053 COMPREHEN METABOLIC PANEL: CPT

## 2024-08-13 PROCEDURE — 36415 COLL VENOUS BLD VENIPUNCTURE: CPT

## 2024-08-13 RX ORDER — ADALIMUMAB-RYVK 40MG/0.4ML
KIT SUBCUTANEOUS
COMMUNITY
Start: 2024-08-08

## 2024-08-13 RX ORDER — BUPROPION HYDROCHLORIDE 100 MG/1
150 TABLET, EXTENDED RELEASE ORAL
COMMUNITY

## 2024-08-13 ASSESSMENT — FIBROSIS 4 INDEX: FIB4 SCORE: 0.59

## 2024-08-13 NOTE — PROGRESS NOTES
[facility] RHEUMATOLOGY  [address], [address], [place] [address]  Phone: [phone] ? Fax: [phone]  [facility]/Health-Services/Rheumatology    FOLLOW-UP VISIT NOTE      DATE OF SERVICE: 08/13/2024         Subjective     PRIMARY CARE PRACTITIONER:  Julian Almazan M.D.  [address]  [place] [address]    PATIENT IDENTIFICATION:  [address]y [address] [facility]  [place] [address]    YOB: 1990    MEDICAL RECORD NUMBER: [id number]          CHIEF COMPLAINT:   Chief Complaint   Patient presents with    Follow-Up     Non-radiographic axial spondyloarthritis       RHEUMATOLOGIC HISTORY:  Johny Candelario is a 34 y.o. male with pertinent history notable for HLA-B27 positive non-radiographic axial spondyloarthritis diagnosed in 2020 (reportedly symptomatic from 4/2020), DJD of lumbosacral spine, and serologic autoimmunity (positive CAMILLE). Previously under the care of a rheumatologist in [place] (Dr. Loretta Alva at [facility] in [place], [place]), he relocated to [place] in 6/2023 and initially presented on 1/16/24 to establish care for continued evaluation and management of his condition. Reported onset of symptoms in 4/2020 with chronic lower back pain with prolonged morning stiffness that improved with activity which prompted his diagnostic evaluation at the time. Following his spondyloarthritis diagnosis, he was initially started on Humira which he took for only 1 dose before being switched to Cosentyx due to insurance issues. He had been doing quite well since then with no significant back pain or stiffness and felt that Cosentyx had been very helpful for his condition. However, he reported a history of episodic yeast infection of the anus with some fissures for which he had been using antifungal and topical anesthetic which provided some relief. He had also been noticing increased mucus in his stool and some blood after wiping which he thought  was from the anal fissure. Given a family history of colon cancer in his father, he reportedly underwent colonoscopy in 11/2023 by a colorectal surgeon, Dr. Kyle Raymond in [place], [place] (showed colonic inflammation per result below) and later established with gastroenterology in [place]. Reported other aspects of his symptoms and medical history as noted on the initial visit questionnaire.     Pertinent treatments: Gabapentin 100 mg twice daily PRN (unclear benefit), meloxicam 15>7.5 mg QD>BID PRN (last ordered 6/27/24, effective), Humira (stopped after 1 dose due to insurance issues), Cosentyx 150 mg SC every 2 weeks (2020-2/2024, partially effective), Humira 40 mg SC every 2 weeks (ordered 1/16/24-present).     Pertinent positive labs: Positive HLA-B27 and positive CAMILLE 1:160 with negative reflex panel (in 8/2021 per former rheumatologist's note); positive fecal lactoferrin and fecal calprotectin 122 (in 2/2024).     Pertinent negative labs: Negative RF, anti-CCP, C3 and C4 (in 8/2021), SPEP, HBV, and QTB (in 10/2022), ASCA (in 1/2024), CRP, ESR, eGFR, creatinine, LFTs, and CBC (in 8/2024).     Colonoscopy with biopsy (in 11/2023 at [facility]): Colonic/rectal mucosa with increase in surface intraepithelial neutrophils and eosinophils, and lymphoid aggregates in lamina propria.     Pertinent XR imaging (in 10/2022 reports on former rheumatologist's note): Lumbar spine with facet arthrosis from L3 through S1, mild at L3-L4, mild-to-moderate at L4-L5, and moderate-to-severe at L5-S1; spondylitic ridging of the posterior endplates at L5-S1 suspicious for spondylolysis with spondylolisthesis. Thoracic spine and SI joints with no evidence of degenerative or inflammatory arthropathy.      INTERVAL HISTORY:  Reports interval history as noted on the questionnaire below or scanned under media tab.  Notably waxing/waning aches in the midline upper/mid/lower back, but otherwise doing quite well.  Previously  reported skin rash much improved following 2 weeks of steroid cream (fluocinonide) prescribed by dermatology following skin punch biopsy.  Has since resumed Humira biosimilar with no issues.    REVIEW OF SYSTEMS:  Except as noted in the history above, relevant review of systems with emphasis on autoimmune rheumatic diseases was otherwise negative.      ACTIVE PROBLEM LIST:  Patient Active Problem List    Diagnosis Date Noted    Rash and nonspecific skin eruption 06/27/2024    Positive fecal immunochemical test 02/28/2024    HLA-B27 non-radiographic axial spondyloarthritis (HCC) 01/16/2024    DJD (degenerative joint disease), lumbosacral 01/16/2024    Immunosuppressed status (HCC) 01/16/2024    Increased mucus in stool 01/16/2024       PAST MEDICAL HISTORY:  History reviewed. No pertinent past medical history.    PAST SURGICAL HISTORY:  History reviewed. No pertinent surgical history.    SOCIAL HISTORY:  Social History     Socioeconomic History    Marital status: Single     Spouse name: Not on file    Number of children: Not on file    Years of education: Not on file    Highest education level: Not on file   Occupational History    Not on file   Tobacco Use    Smoking status: Never    Smokeless tobacco: Never    Tobacco comments:     Does use nicotine patches    Vaping Use    Vaping status: Never Used   Substance and Sexual Activity    Alcohol use: Yes     Comment: moderate use    Drug use: Never    Sexual activity: Not on file   Other Topics Concern    Not on file   Social History Narrative    Not on file     Social Determinants of Health     Financial Resource Strain: Not on file   Food Insecurity: Not on file   Transportation Needs: Not on file   Physical Activity: Not on file   Stress: Not on file   Social Connections: Not on file   Intimate Partner Violence: Not on file   Housing Stability: Not on file       FAMILY HISTORY:  History reviewed. No pertinent family history.    MEDICATIONS:  Current Outpatient  "Medications   Medication Sig    Adalimumab-ryvk, 2 Pen, 40 MG/0.4ML Auto-injector Kit     buPROPion SR (WELLBUTRIN-SR) 100 MG TABLET SR 12 HR Take 150 mg by mouth.    amphetamine-dextroamphetamine (ADDERALL XR) 15 MG XR capsule        ALLERGIES:   No Known Allergies    IMMUNIZATIONS:  Immunization History   Administered Date(s) Administered    MODERNA SARS-COV-2 VACCINE (12+) 03/08/2021, 04/05/2021            Objective     Vital Signs: /82 (BP Location: Left arm, Patient Position: Sitting, BP Cuff Size: Adult)   Pulse 68   Temp 36.6 °C (97.9 °F) (Temporal)   Ht 1.753 m (5' 9\")   Wt 77.7 kg (171 lb 4 oz)   SpO2 95% Body mass index is 25.29 kg/m².    General: Appears well and comfortable  Eyes: No scleral or conjunctival lesions  ENT: No apparent oral, nasal, or ear lesions  Head/Neck: No apparent scalp or neck lesions  Cardiovascular: Regular rate and rhythm  Respiratory: Breathing quiet and unlabored  Gastrointestinal: No apparent organomegaly or abdominal masses  Integumentary: Trace residual erythema around axilla and flanks; no significant cutaneous lesions or dyspigmentation  Musculoskeletal: No significant joint tenderness, periarticular soft tissue swelling, warmth, erythema, or overt synovitis; no significant restriction in range of motion of joints examined  Neurologic: No focal sensory or motor deficits  Psychiatric: Mood and affect appropriate      LABORATORY RESULTS REVIEWED AND INTERPRETED BY ME:  Lab Results   Component Value Date/Time    CREACTPROT <0.30 08/13/2024 09:22 AM    SEDRATEWES 15 08/13/2024 09:22 AM     Lab Results   Component Value Date/Time    WBC 6.1 08/13/2024 09:22 AM    RBC 4.72 08/13/2024 09:22 AM    HEMOGLOBIN 15.4 08/13/2024 09:22 AM    HEMATOCRIT 45.8 08/13/2024 09:22 AM    MCV 97.0 08/13/2024 09:22 AM    MCH 32.6 08/13/2024 09:22 AM    MCHC 33.6 08/13/2024 09:22 AM    RDW 42.4 08/13/2024 09:22 AM    PLATELETCT 319 08/13/2024 09:22 AM    MPV 9.9 08/13/2024 09:22 AM    " NEUTS 2.12 08/13/2024 09:22 AM    LYMPHOCYTES 49.00 (H) 08/13/2024 09:22 AM    MONOCYTES 7.70 08/13/2024 09:22 AM    EOSINOPHILS 7.40 (H) 08/13/2024 09:22 AM    BASOPHILS 0.80 08/13/2024 09:22 AM     Lab Results   Component Value Date/Time    ASTSGOT 21 08/13/2024 09:22 AM    ALTSGPT 16 08/13/2024 09:22 AM    ALKPHOSPHAT 80 08/13/2024 09:22 AM    TBILIRUBIN 0.6 08/13/2024 09:22 AM    TOTPROTEIN 7.2 08/13/2024 09:22 AM    ALBUMIN 4.3 08/13/2024 09:22 AM     Lab Results   Component Value Date/Time    SODIUM 136 08/13/2024 09:22 AM    POTASSIUM 4.7 08/13/2024 09:22 AM    CHLORIDE 99 08/13/2024 09:22 AM    CO2 25 08/13/2024 09:22 AM    GLUCOSE 90 08/13/2024 09:22 AM    BUN 20 08/13/2024 09:22 AM    CREATININE 1.07 08/13/2024 09:22 AM    CALCIUM 9.3 08/13/2024 09:22 AM       RADIOLOGY RESULTS REVIEWED AND INTERPRETED BY ME:  No loadable results found in the system. Pertinent non-system results, if applicable, summarized under history above.      All relevant laboratory and imaging results reported on this note were reviewed and interpreted by me.         Assessment & Plan     Johny Candelario is a 34 y.o. male with history and physical as noted above whose presentation merits the following clinical impressions and recommendations:     1. HLA-B27 non-radiographic axial spondyloarthritis (HCC)  Clinically and serologically without significant evidence of disease activity on the current regimen of Humira, so no need for modification of treatment. Given the concern for possible Humira-related skin eruption, such as psoriasis, reasonable to check to see if he has developed hypersensitivity with formation of drug neutralizing antibodies.  - ADALIMUMAB AND ABS, QUANT; Future  - CRP QUANTITIVE (NON-CARDIAC); Future  - Sed Rate; Future  - Continue Humira 40 mg SC every 2 weeks  - Consider sulfasalazine depending on his clinical trajectory     2. DJD (degenerative joint disease), lumbosacral  Presumably an important etiology  of his chronic back pain which can be managed supportively.   - Topical analgesics, Tylenol Arthritis, and an oral NSAID as needed  - Consider referral to pain management if that becomes necessary    3. Rash and nonspecific skin eruption  Raised concern for possible drug-induced psoriasis secondary to Humira use versus contact dermatitis. Clinically much improved following a course of topical steroid by dermatology following skin punch biopsy.  - Need to obtain record of dermatopathology from the biopsy    4. Positive fecal immunochemical test  Raised concern for IBD in the setting of his positive HLA-B27 given history of stool mucus with blood, positive fecal calprotectin and fecal lactoferrin, hence the switch from Cosentyx to Humira.  - Consider sulfasalazine as above depending on his clinical trajectory  - Need to obtain results from colonoscopy in 3/2024 at [facility]  - Consider repeat colonoscopy within 3-5 years     5. Immunosuppressed status (HCC)  Presently with no history, physical, or laboratory evidence to suggest significant adverse drug effects or opportunistic infections, but need routine monitoring per guidelines.  - CBC WITH DIFFERENTIAL; Future  - Comp Metabolic Panel; Future  - Need to ascertain age-appropriate vaccines in addition to the ones listed above under immunizations      The above assessment and plan were discussed with the patient who acknowledged understanding of the plan.    FOLLOW-UP: Return in about 6 months (around 2/13/2025) for Short.         Thank you for the opportunity to participate in the care of Johny Candelario.    Ap Garcia MD, MS, FACR  Rheumatologist, [facility], [facility]   of Clinical Medicine, Department of Internal Medicine  [facility]

## 2024-08-13 NOTE — PATIENT INSTRUCTIONS
[facility]    Below are important guidelines to help you navigate your health care needs and assist us in caring for you safely and effectively. We encourage you to carefully read and understand this information and adhere to them accordingly.    [employer] Messaging and Phone Calls:  Diagnosis and Treatment - For a detailed explanation of your condition and treatment plan from today's visit, refer to the visit note on [employer] via the following steps:  Log in to [employer] and click on “Visits” at the top.  Scroll down to “Past Visits” under Appointments.  Click on “View Notes” under the appropriate visit date.  Questions or Concerns - MyChart messaging is for non-urgent matters that do not require immediate attention and should be brief with no more than two questions or concerns. If you have multiple questions or concerns, we ask that you schedule an appointment to have them properly addressed.  Response to Messages - [employer] messages are addressed throughout the week depending on clinical availability, so we ask that you allow up to one week for a response.  Phone Calls and Voicemails - Phone calls and voicemail messages are reserved for time-sensitive matters that cannot wait to be addressed via [employer]. We ask that you refrain from calling the office multiple times or leaving multiple voicemails regarding the same issue as doing so may lead to delays in response time.  Urgent Issues - For urgent medical matters or medical emergencies that cannot wait, you are advised to go to your nearest Urgent Care or Emergency Department for immediate attention.    Laboratory Tests and Imaging Studies:  Future Lab and Imaging Orders - We ask that you get your lab tests and imaging studies done no later than one week before your follow-up visit unless instructed otherwise.  Results Communication - You may see some test results marked as “abnormal” that are not necessarily significant or concerning. If  there are significant abnormalities on your test results that warrant further action, you will be notified via MyChart or phone call, otherwise they will be addressed at your follow-up visit.    Prescriptions and Refill Requests:  General Prescriptions (e.g. prednisone, hydroxychloroquine, leflunomide, methotrexate, etc.) - These are sent to Retail Pharmacies, so all refill requests of these medications should be directed to your local pharmacy.  Specialty Prescriptions (e.g. Enbrel, Humira, Cosentyx, Xeljanz, etc.) - These are sent to Specialty Pharmacies, so all refill requests of these medications should be directed to your designated specialty pharmacy.  Infusion Prescriptions (e.g. Remicade, Simponi Aria, Rituxan, Saphnelo, etc.) - These are sent to Outpatient Infusion Centers, so all scheduling requests of these medications should be directed to your local infusion center.    Medication Risks and Adverse Effects:  Immunosuppressed Status - Steroids and antirheumatic drugs are immunosuppressants, so they increase the risk of infections and can have side effects on various organ systems in your body, though most of them are uncommon.  Potential Side Effects - Be sure to read the drug package inserts to learn about the potential side effects of your medications before you start taking them and take them exactly as prescribed unless instructed otherwise.  In Case of Side Effects - If you experience any significant side effects, stop taking the medication immediately and promptly notify the prescriber. Depending on the severity of the side effects, consider going to an Urgent Care or Emergency Department for immediate attention.    Immunizations and Health Screening:  Vaccinations - If you are on immunosuppressive therapy, it is important that you are up to date on age-appropriate immunizations, particularly shingles and pneumonia vaccines, which you can request from your primary care provider or from us at your  next appointment.  Screening Tests - It is also important that you are up to date on age-appropriate screening tests, such as pap smear, mammography, and colonoscopy, which you can request from your primary care provider.    Educational and Supportive Resources:  [facility] ([email or web address]-[email or web address]/[email or web address]) - Visit our website to learn more about your condition and other rheumatic diseases, and gain access to many helpful resources for them.  Disposal of Old Medications (www.jami.gov/everyday-takeback-day) - Visit the Drug Enforcement Administration website to find a nearby location where you can properly dispose of old medications you no longer need.  Disposal of Used [place] (www.safeneedledisposal.org) - Visit the Safe Needle Disposal Organization website to find a nearby location where you can properly dispose of used needles from your injectable medications.

## 2024-08-19 LAB
ADALIMUMAB AB SERPL IA-MCNC: 194 NG/ML
ADALIMUMAB SERPL IA-MCNC: 3.7 UG/ML

## 2024-12-31 DIAGNOSIS — M45.A0 NON-RADIOGRAPHIC AXIAL SPONDYLOARTHRITIS (HCC): ICD-10-CM

## 2024-12-31 RX ORDER — ADALIMUMAB-RYVK 40MG/0.4ML
40 KIT SUBCUTANEOUS
Qty: 2 EACH | Refills: 0 | Status: SHIPPED | OUTPATIENT
Start: 2024-12-31 | End: 2025-01-28

## 2024-12-31 NOTE — TELEPHONE ENCOUNTER
Medication refilled for 28 days.  Please call and update patient that he needs to get lab work done and to schedule appointment prior to sending in any additional refills

## 2024-12-31 NOTE — TELEPHONE ENCOUNTER
Received request via: Pharmacy    Was the patient seen in the last year in this department? Yes    Does the patient have an active prescription (recently filled or refills available) for medication(s) requested? No    Pharmacy Name: [facility]    Does the patient have custodial Plus and need 100-day supply? (This applies to ALL medications) Patient does not have SCP

## 2025-01-06 ENCOUNTER — TELEPHONE (OUTPATIENT)
Dept: RHEUMATOLOGY | Facility: MEDICAL CENTER | Age: 35
End: 2025-01-06
Payer: COMMERCIAL

## 2025-01-06 NOTE — TELEPHONE ENCOUNTER
Prior Authorization for Simlandi (2 Pen) 40MG/0.4ML auto-injector kit has been approved for a quantity of 2 , day supply 28    Prior Authorization reference number: [id number]  Insurance: Noemy  Effective dates: 01/06/2025 to 01/06/2026

## 2025-01-06 NOTE — TELEPHONE ENCOUNTER
Prior Authorization for Simlandi (2 Pen) 40MG/0.4ML auto-injector kit (Quantity: 2, Days: 28) has been submitted via Cover My Meds: Key ([id number])    Insurance: Noemy    Will follow up in 24-48 business hours.

## 2025-02-12 ENCOUNTER — PATIENT MESSAGE (OUTPATIENT)
Dept: RHEUMATOLOGY | Facility: MEDICAL CENTER | Age: 35
End: 2025-02-12

## 2025-02-12 ENCOUNTER — OFFICE VISIT (OUTPATIENT)
Dept: RHEUMATOLOGY | Facility: MEDICAL CENTER | Age: 35
End: 2025-02-12
Attending: STUDENT IN AN ORGANIZED HEALTH CARE EDUCATION/TRAINING PROGRAM
Payer: COMMERCIAL

## 2025-02-12 VITALS
OXYGEN SATURATION: 100 % | TEMPERATURE: 96.9 F | DIASTOLIC BLOOD PRESSURE: 62 MMHG | WEIGHT: 176.5 LBS | HEIGHT: 69 IN | BODY MASS INDEX: 26.14 KG/M2 | HEART RATE: 66 BPM | SYSTOLIC BLOOD PRESSURE: 114 MMHG

## 2025-02-12 DIAGNOSIS — M47.817 DJD (DEGENERATIVE JOINT DISEASE), LUMBOSACRAL: ICD-10-CM

## 2025-02-12 DIAGNOSIS — D84.9 IMMUNOSUPPRESSED STATUS (HCC): ICD-10-CM

## 2025-02-12 DIAGNOSIS — F98.8 ATTENTION DEFICIT DISORDER (ADD) IN ADULT: ICD-10-CM

## 2025-02-12 DIAGNOSIS — R19.5 POSITIVE FECAL IMMUNOCHEMICAL TEST: ICD-10-CM

## 2025-02-12 DIAGNOSIS — L30.8 SPONGIOTIC PSORIASIFORM DERMATITIS: ICD-10-CM

## 2025-02-12 DIAGNOSIS — M62.830 MUSCLE SPASM OF BACK: ICD-10-CM

## 2025-02-12 DIAGNOSIS — M45.A0 NON-RADIOGRAPHIC AXIAL SPONDYLOARTHRITIS (HCC): ICD-10-CM

## 2025-02-12 PROCEDURE — 3078F DIAST BP <80 MM HG: CPT | Performed by: STUDENT IN AN ORGANIZED HEALTH CARE EDUCATION/TRAINING PROGRAM

## 2025-02-12 PROCEDURE — 99214 OFFICE O/P EST MOD 30 MIN: CPT | Performed by: STUDENT IN AN ORGANIZED HEALTH CARE EDUCATION/TRAINING PROGRAM

## 2025-02-12 PROCEDURE — 3074F SYST BP LT 130 MM HG: CPT | Performed by: STUDENT IN AN ORGANIZED HEALTH CARE EDUCATION/TRAINING PROGRAM

## 2025-02-12 PROCEDURE — 99212 OFFICE O/P EST SF 10 MIN: CPT | Performed by: STUDENT IN AN ORGANIZED HEALTH CARE EDUCATION/TRAINING PROGRAM

## 2025-02-12 RX ORDER — DEXTROAMPHETAMINE SACCHARATE, AMPHETAMINE ASPARTATE MONOHYDRATE, DEXTROAMPHETAMINE SULFATE AND AMPHETAMINE SULFATE 3.75; 3.75; 3.75; 3.75 MG/1; MG/1; MG/1; MG/1
CAPSULE, EXTENDED RELEASE ORAL
Qty: 30 CAPSULE | Refills: 0 | Status: CANCELLED
Start: 2025-02-12

## 2025-02-12 ASSESSMENT — PATIENT HEALTH QUESTIONNAIRE - PHQ9: CLINICAL INTERPRETATION OF PHQ2 SCORE: 0

## 2025-02-12 ASSESSMENT — FIBROSIS 4 INDEX: FIB4 SCORE: 0.56

## 2025-02-12 NOTE — PATIENT COMMUNICATION
Received request via: Patient    Was the patient seen in the last year in this department? Yes    Does the patient have an active prescription (recently filled or refills available) for medication(s) requested? No    [facility]    Does the patient have retirement Plus and need 100-day supply? (This applies to ALL medications) Patient does not have SCP

## 2025-02-12 NOTE — PATIENT INSTRUCTIONS
[facility]    Below are important guidelines to help you navigate your health care needs and assist us in caring for you safely and effectively. We encourage you to carefully read and understand this information and adhere to them accordingly.    CultureMap Messaging and Phone Calls:  Diagnosis and Treatment - For a detailed explanation of your condition and treatment plan from today's visit, refer to the visit note on CultureMap via the following steps:  Log in to CultureMap and click on “Visits” at the top.  Scroll down to “Past Visits” under Appointments.  Click on “View Notes” under the appropriate visit date.  Questions or Concerns - MyChart messaging is for non-urgent matters that do not require immediate attention and should be brief with no more than two questions or concerns. If you have multiple questions or concerns, we ask that you schedule an appointment to have them properly addressed.  Response to Messages - CultureMap messages are addressed throughout the week depending on clinical availability, so we ask that you allow up to one week for a response.  Phone Calls and Voicemails - Phone calls and voicemail messages are reserved for time-sensitive matters that cannot wait to be addressed via CultureMap. We ask that you refrain from calling the office multiple times or leaving multiple voicemails regarding the same issue as doing so may lead to delays in response time.  Urgent Issues - For urgent medical matters or medical emergencies that cannot wait, you are advised to go to your nearest Urgent Care or Emergency Department for immediate attention.    Laboratory Tests and Imaging Studies:  Future Lab and Imaging Orders - We ask that you get your lab tests and imaging studies done no later than one week before your follow-up visit unless instructed otherwise.  Results Communication - You may see some test results marked as “abnormal” that are not necessarily significant or concerning. If  there are significant abnormalities on your test results that warrant further action, you will be notified via MyChart or phone call, otherwise they will be addressed at your follow-up visit.    Prescriptions and Refill Requests:  General Prescriptions (e.g. prednisone, hydroxychloroquine, leflunomide, methotrexate, etc.) - These are sent to Retail Pharmacies, so all refill requests of these medications should be directed to your local pharmacy.  Specialty Prescriptions (e.g. Enbrel, Humira, Cosentyx, Xeljanz, etc.) - These are sent to Specialty Pharmacies, so all refill requests of these medications should be directed to your designated specialty pharmacy.  Infusion Prescriptions (e.g. Remicade, Simponi Aria, Rituxan, Saphnelo, etc.) - These are sent to Outpatient Infusion Centers, so all scheduling requests of these medications should be directed to your local infusion center.    Medication Risks and Adverse Effects:  Immunosuppressed Status - Steroids and antirheumatic drugs are immunosuppressants, so they increase the risk of infections and can have side effects on various organ systems in your body, though most of them are uncommon.  Potential Side Effects - Be sure to read the drug package inserts to learn about the potential side effects of your medications before you start taking them and take them exactly as prescribed unless instructed otherwise.  In Case of Side Effects - If you experience any significant side effects, stop taking the medication immediately and promptly notify the prescriber. Depending on the severity of the side effects, consider going to an Urgent Care or Emergency Department for immediate attention.    Immunizations and Health Screening:  Vaccinations - If you are on immunosuppressive therapy, it is important that you are up to date on age-appropriate immunizations, particularly shingles and pneumonia vaccines, which you can request from your primary care provider or from us at your  next appointment.  Screening Tests - It is also important that you are up to date on age-appropriate screening tests, such as pap smear, mammography, and colonoscopy, which you can request from your primary care provider.    Educational and Supportive Resources:  [facility] ([email or web address]) - Visit our website to learn more about your condition and other rheumatic diseases, and gain access to many helpful resources for them.  Disposal of Old Medications (www.jami.gov/everyday-takeback-day) - Visit the Drug Enforcement Administration website to find a nearby location where you can properly dispose of old medications you no longer need.  Disposal of Used [place] (www.safeneedledisposal.org) - Visit the Safe Needle Disposal Organization website to find a nearby location where you can properly dispose of used needles from your injectable medications.

## 2025-02-12 NOTE — PROGRESS NOTES
[facility] RHEUMATOLOGY  [address], [address], [place] [address]  Phone: [phone] ? Fax: [phone]  [facility] [facility]/Health-Services/Rheumatology    FOLLOW-UP VISIT NOTE      DATE OF SERVICE: 02/12/2025         Subjective     PRIMARY CARE PRACTITIONER:  Julian Almazan M.D.  [address]  [place] [address]    PATIENT IDENTIFICATION:  Johny [address]  [place] [address]    YOB: 1990    MEDICAL RECORD NUMBER: [id number]          CHIEF COMPLAINT:   Chief Complaint   Patient presents with    Follow-Up     HLA-B27 non-radiographic axial spondyloarthritis (HCC)       RHEUMATOLOGIC HISTORY:  Johny Candelario is a 34 y.o. male with pertinent history notable for HLA-B27 positive non-radiographic axial spondyloarthritis diagnosed in 2020 (reportedly symptomatic from 4/2020), DJD of lumbosacral spine, and serologic autoimmunity (positive CAMILLE). Previously under the care of a rheumatologist in [place] (Dr. Loretta Alva at [facility] in [place], [place]), he relocated to [place] in 6/2023 and initially presented on 1/16/24 to establish care for continued evaluation and management of his condition. Reported onset of symptoms in 4/2020 with chronic lower back pain with prolonged morning stiffness that improved with activity which prompted his diagnostic evaluation at the time. Following his spondyloarthritis diagnosis, he was initially started on Humira which he took for only 1 dose before being switched to Cosentyx due to insurance issues. He had been doing quite well since then with no significant back pain or stiffness and felt that Cosentyx had been very helpful for his condition. However, he reported a history of episodic yeast infection of the anus with some fissures for which he had been using antifungal and topical anesthetic which provided some relief. He had also been noticing increased mucus in his stool and some blood after wiping  which he thought was from the anal fissure. Given a family history of colon cancer in his father, he reportedly underwent colonoscopy in 11/2023 by a colorectal surgeon, Dr. Kyle Raymond in [place], [place] (showed colonic inflammation per result below) and later established with gastroenterology in [place]. Reported other aspects of his symptoms and medical history as noted on the initial visit questionnaire.     Pertinent treatments: Gabapentin 100 mg twice daily PRN (unclear benefit), meloxicam 15>7.5 mg QD>BID PRN (last ordered 6/27/24, effective), Humira (stopped after 1 dose due to insurance issues), Cosentyx 150 mg SC every 2 weeks (2020-2/2024, partially effective), Humira 40 mg SC every 2 weeks (ordered 1/16/24-present).     Pertinent positive labs: Positive HLA-B27 and positive CAMILLE 1:160 with negative reflex panel (in 8/2021 per former rheumatologist's note); positive fecal lactoferrin and fecal calprotectin 122 (in 2/2024).     Pertinent negative labs: Negative RF, anti-CCP, C3 and C4 (in 8/2021), SPEP, HBV, and QTB (in 10/2022), ASCA (in 1/2024), CRP, ESR, eGFR, creatinine, LFTs, and CBC (in 8/2024).     Colonoscopy with biopsy (in 11/2023 at [facility]): Colonic/rectal mucosa with increase in surface intraepithelial neutrophils and eosinophils, and lymphoid aggregates in lamina propria.     Pertinent XR imaging (in 10/2022 reports on former rheumatologist's note): Lumbar spine with facet arthrosis from L3 through S1, mild at L3-L4, mild-to-moderate at L4-L5, and moderate-to-severe at L5-S1; spondylitic ridging of the posterior endplates at L5-S1 suspicious for spondylolysis with spondylolisthesis. Thoracic spine and SI joints with no evidence of degenerative or inflammatory arthropathy..      INTERVAL HISTORY:  Reports interval history as noted on the questionnaire below or scanned under media tab.            REVIEW OF SYSTEMS:  Except as noted in the history above, relevant review of systems  with emphasis on autoimmune rheumatic diseases was otherwise negative.      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    PAST SURGICAL HISTORY:  No past surgical history on file.    SOCIAL HISTORY:  Social History     Socioeconomic History    Marital status: Single     Spouse name: Not on file    Number of children: Not on file    Years of education: Not on file    Highest education level: Not on file   Occupational History    Not on file   Tobacco Use    Smoking status: Never    Smokeless tobacco: Never    Tobacco comments:     Does use nicotine patches    Vaping Use    Vaping status: Never Used   Substance and Sexual Activity    Alcohol use: Yes     Comment: moderate use    Drug use: Never    Sexual activity: Not on file   Other Topics Concern    Not on file   Social History Narrative    Not on file     Social Drivers of Health     Financial Resource Strain: Not on file   Food Insecurity: Not on file   Transportation Needs: Not on file   Physical Activity: Not on file   Stress: Not on file   Social Connections: Not on file   Intimate Partner Violence: Not on file   Housing Stability: Not on file       FAMILY HISTORY:  No family history on file.    MEDICATIONS:  Current Outpatient Medications   Medication Sig    buPROPion SR (WELLBUTRIN-SR) 100 MG TABLET SR 12 HR Take 150 mg by mouth.    amphetamine-dextroamphetamine (ADDERALL XR) 15 MG XR capsule        ALLERGIES:   No Known Allergies    IMMUNIZATIONS:  Immunization History   Administered Date(s) Administered    MODERNA SARS-COV-2 VACCINE (12+) 03/08/2021, 04/05/2021  "           Objective     Vital Signs: /62 (BP Location: Left arm, Patient Position: Sitting, BP Cuff Size: Adult)   Pulse 66   Temp 36.1 °C (96.9 °F) (Temporal)   Ht 1.753 m (5' 9\")   Wt 80.1 kg (176 lb 8 oz)   SpO2 100% Body mass index is 26.06 kg/m².    General: Appears well and comfortable  Eyes: No scleral or conjunctival lesions  ENT: No apparent oral, nasal, or ear lesions  Head/Neck: No apparent scalp or neck lesions  Cardiovascular: Regular rate and rhythm  Respiratory: Breathing quiet and unlabored  Gastrointestinal: No apparent organomegaly or abdominal masses  Integumentary: 2 small maculopapular lesions on the left axilla. No significant dyspigmentation  Musculoskeletal: No significant joint tenderness, swelling, warmth, erythema, or overt synovitis; no significant restriction in range of motion of joints examined  Neurologic: No focal sensory or motor deficits  Psychiatric: Mood and affect appropriate      LABORATORY RESULTS REVIEWED AND INTERPRETED:  Lab Results   Component Value Date/Time    CREACTPROT <0.30 08/13/2024 09:22 AM    SEDRATEWES 15 08/13/2024 09:22 AM     Lab Results   Component Value Date/Time    WBC 6.1 08/13/2024 09:22 AM    RBC 4.72 08/13/2024 09:22 AM    HEMOGLOBIN 15.4 08/13/2024 09:22 AM    HEMATOCRIT 45.8 08/13/2024 09:22 AM    MCV 97.0 08/13/2024 09:22 AM    MCH 32.6 08/13/2024 09:22 AM    MCHC 33.6 08/13/2024 09:22 AM    RDW 42.4 08/13/2024 09:22 AM    PLATELETCT 319 08/13/2024 09:22 AM    MPV 9.9 08/13/2024 09:22 AM    NEUTS 2.12 08/13/2024 09:22 AM    LYMPHOCYTES 49.00 (H) 08/13/2024 09:22 AM    MONOCYTES 7.70 08/13/2024 09:22 AM    EOSINOPHILS 7.40 (H) 08/13/2024 09:22 AM    BASOPHILS 0.80 08/13/2024 09:22 AM     Lab Results   Component Value Date/Time    ASTSGOT 21 08/13/2024 09:22 AM    ALTSGPT 16 08/13/2024 09:22 AM    ALKPHOSPHAT 80 08/13/2024 09:22 AM    TBILIRUBIN 0.6 08/13/2024 09:22 AM    TOTPROTEIN 7.2 08/13/2024 09:22 AM    ALBUMIN 4.3 08/13/2024 09:22 " AM     Lab Results   Component Value Date/Time    SODIUM 136 08/13/2024 09:22 AM    POTASSIUM 4.7 08/13/2024 09:22 AM    CHLORIDE 99 08/13/2024 09:22 AM    CO2 25 08/13/2024 09:22 AM    GLUCOSE 90 08/13/2024 09:22 AM    BUN 20 08/13/2024 09:22 AM    CREATININE 1.07 08/13/2024 09:22 AM    CALCIUM 9.3 08/13/2024 09:22 AM       RADIOLOGY RESULTS REVIEWED AND INTERPRETED:  No relevant loadable results found in the system. Pertinent non-system results, if applicable, summarized under history above.      All relevant laboratory and imaging results reported on this note were reviewed and interpreted by me.         Assessment & Plan     Johny Candelario is a 34 y.o. male with history and physical as noted above whose presentation merits the following clinical impressions and recommendations:    1. HLA-B27 non-radiographic axial spondyloarthritis (HCC)  Clinically and serologically without significant evidence of disease activity on the current regimen of Humira, so no need for modification of treatment. The presence of anti-adalimumab antibody raises some concern for possible eventual neutralization which could render the medication ineffective, so would need repeat testing over time.  - CRP and ESR (previously ordered)  - Continue Humira 40 mg SC every 2 weeks  - Consider sulfasalazine depending on his clinical trajectory    2. DJD (degenerative joint disease), lumbosacral  Presumably an important etiology of his chronic back pain which can be managed supportively. Also associated with intermittent  upper back muscular spasms.  - Topical analgesics, Tylenol Arthritis, and an oral NSAID as needed  - Consider referral to pain management if that becomes necessary    3. Spongiotic psoriasiform dermatitis  Raised concern for possible drug-induced psoriasis secondary to Humira in the setting of his HLA-B27 positivity. Clinically much improved following a course of topical steroid by dermatology s/p skin punch biopsy that  showed spongiotic psoriasiform dermatitis.  - Routine follow-up with dermatology as needed    4. Positive fecal immunochemical test  Initially raised concern for IBD in the setting of his positive HLA-B27 given history of stool mucus with blood, positive fecal calprotectin and fecal lactoferrin, hence the switch from Cosentyx to Humira. However given the colonoscopy report sent to the patient, it is unremarkable for specific lesions or polyps other than internal hemorrhoids.  - Consider sulfasalazine as above depending on his clinical trajectory  - May need to obtain pathology report if any from colonoscopy in 3/2024 at [facility]  - Consider repeat colonoscopy as indicated per GI recommendations depending on his clinical trajectory    5. Immunosuppressed status (HCC)  Presently with no history, physical, or laboratory evidence to suggest significant adverse drug effects or opportunistic infections, but need routine monitoring per guidelines.  - CBC and CMP (previously ordered)  - Need to ascertain age-appropriate vaccines in addition to the ones listed above under immunizations      The above assessment and plan were discussed with the patient who acknowledged understanding of the plan.    FOLLOW-UP: Return in about 6 months (around 8/12/2025) for Short.         Thank you for the opportunity to participate in the care of Johny Candelario.    Denise Canseco M.D.   [employer], Internal Medicine Resident, PGY-2      ATTENDING ATTESTATION:  I personally saw and examined this patient, supervised and discussed the case with the resident who participated in the care of the patient. I have carefully reviewed the note in its entirety, made modifications as deemed appropriate, and agree with its content. I hereby attest that the documentation accurately reflects the history, physical exam, assessment and plan of care for the patient.    Ap Garcia MD, MS, FACR  Rheumatologist, [facility] Rheumatology, [facility]  [facility]   of Clinical Medicine, Department of Internal Medicine  [facility], [employer] [facility]

## 2025-02-13 PROBLEM — M62.830 MUSCLE SPASM OF BACK: Status: ACTIVE | Noted: 2025-02-13

## 2025-02-13 PROBLEM — F98.8 ATTENTION DEFICIT DISORDER (ADD) IN ADULT: Status: ACTIVE | Noted: 2025-02-13

## 2025-02-13 RX ORDER — TIZANIDINE 2 MG/1
2 TABLET ORAL 3 TIMES DAILY PRN
Qty: 90 TABLET | Refills: 1 | Status: SHIPPED | OUTPATIENT
Start: 2025-02-13

## 2025-02-13 RX ORDER — DEXTROAMPHETAMINE SACCHARATE, AMPHETAMINE ASPARTATE, DEXTROAMPHETAMINE SULFATE AND AMPHETAMINE SULFATE 1.25; 1.25; 1.25; 1.25 MG/1; MG/1; MG/1; MG/1
5 TABLET ORAL DAILY
Qty: 30 TABLET | Refills: 0 | Status: SHIPPED | OUTPATIENT
Start: 2025-02-13 | End: 2025-03-15

## 2025-02-13 RX ORDER — DEXTROAMPHETAMINE SACCHARATE, AMPHETAMINE ASPARTATE MONOHYDRATE, DEXTROAMPHETAMINE SULFATE AND AMPHETAMINE SULFATE 3.75; 3.75; 3.75; 3.75 MG/1; MG/1; MG/1; MG/1
15 CAPSULE, EXTENDED RELEASE ORAL EVERY MORNING
Qty: 30 CAPSULE | Refills: 0 | Status: SHIPPED | OUTPATIENT
Start: 2025-02-13 | End: 2025-03-15

## 2025-04-01 ENCOUNTER — PATIENT MESSAGE (OUTPATIENT)
Dept: RHEUMATOLOGY | Facility: MEDICAL CENTER | Age: 35
End: 2025-04-01
Payer: COMMERCIAL

## 2025-04-01 DIAGNOSIS — M45.A0 NON-RADIOGRAPHIC AXIAL SPONDYLOARTHRITIS (HCC): ICD-10-CM

## 2025-04-04 RX ORDER — ADALIMUMAB 40MG/0.4ML
40 KIT SUBCUTANEOUS
Qty: 2 EACH | Refills: 11 | Status: SHIPPED | OUTPATIENT
Start: 2025-04-04

## 2025-04-16 ENCOUNTER — PATIENT MESSAGE (OUTPATIENT)
Dept: RHEUMATOLOGY | Facility: MEDICAL CENTER | Age: 35
End: 2025-04-16
Payer: COMMERCIAL

## 2025-04-16 DIAGNOSIS — M45.A0 NON-RADIOGRAPHIC AXIAL SPONDYLOARTHRITIS (HCC): ICD-10-CM

## 2025-04-16 NOTE — PROGRESS NOTES
Contacted by pharmacy liaison for request for Adalimumab-ryvk, 2 Pen, 40 MG/0.4ML Auto-injector Kit (simlandi) prescription[facility] Chart and labs reviewed. Med refilled.

## 2025-04-17 ENCOUNTER — TELEPHONE (OUTPATIENT)
Dept: RHEUMATOLOGY | Facility: MEDICAL CENTER | Age: 35
End: 2025-04-17
Payer: COMMERCIAL

## 2025-04-17 NOTE — TELEPHONE ENCOUNTER
VOICEMAIL: 25  1. Caller Name: Tawnya faye/[facility]                      Call Back Number: [phone]    2. Message: Tawnya with [employer] called and lm, Pt needs a new rx: Adalimumab 40mg pen. [facility] is needing this to ship pt's medication. Pt has missed a few doses because of a  rx. Rx needs to be sent to [facility]. #[phone] fax#[phone]    3. Patient approves office to leave a detailed voicemail/MyChart message: N\A      25:  Pt called and lm in regards to this medication. He was informed the medication is needing a PA. Pt would like a call back. #[phone]      25:  Called and spoke with Cornelio ZAZUETA with [facility] (#[phone]) to check status of pt's rx. They stated they needed a new rx. I informed them we have sent a rx yesterday. He relooked and pt had another acct. He informed me that it will take up to 5-7 business days to shipping out to pt. (They do have the AUTH on file.)     *Called and spoke with pt. Informed pt of information above. Recommended to pt, to call them tomorrow to make sure they are working on his prescription so there is no more delays and also ask them to merge his two accounts together so there is no further issues in the future. Pt understood.

## 2025-07-24 ENCOUNTER — TELEMEDICINE (OUTPATIENT)
Dept: RHEUMATOLOGY | Facility: MEDICAL CENTER | Age: 35
End: 2025-07-24
Attending: STUDENT IN AN ORGANIZED HEALTH CARE EDUCATION/TRAINING PROGRAM
Payer: COMMERCIAL

## 2025-07-24 VITALS — BODY MASS INDEX: 24.05 KG/M2 | HEIGHT: 70 IN | WEIGHT: 168 LBS

## 2025-07-24 DIAGNOSIS — M45.A0 NON-RADIOGRAPHIC AXIAL SPONDYLOARTHRITIS (HCC): Primary | ICD-10-CM

## 2025-07-24 DIAGNOSIS — L30.8 SPONGIOTIC PSORIASIFORM DERMATITIS: ICD-10-CM

## 2025-07-24 DIAGNOSIS — D84.9 IMMUNOSUPPRESSED STATUS (HCC): ICD-10-CM

## 2025-07-24 DIAGNOSIS — R19.5 POSITIVE FECAL IMMUNOCHEMICAL TEST: ICD-10-CM

## 2025-07-24 DIAGNOSIS — K13.0 ANGULAR CHEILITIS: ICD-10-CM

## 2025-07-24 DIAGNOSIS — M47.817 DJD (DEGENERATIVE JOINT DISEASE), LUMBOSACRAL: ICD-10-CM

## 2025-07-24 PROCEDURE — 99214 OFFICE O/P EST MOD 30 MIN: CPT | Mod: GC,95

## 2025-07-24 RX ORDER — CLOTRIMAZOLE AND BETAMETHASONE DIPROPIONATE 10; .64 MG/G; MG/G
CREAM TOPICAL
COMMUNITY
Start: 2025-06-05

## 2025-07-24 RX ORDER — TRAMADOL HYDROCHLORIDE 50 MG/1
TABLET ORAL
COMMUNITY
Start: 2025-07-07

## 2025-07-24 RX ORDER — DEXTROAMPHETAMINE SACCHARATE, AMPHETAMINE ASPARTATE MONOHYDRATE, DEXTROAMPHETAMINE SULFATE AND AMPHETAMINE SULFATE 5; 5; 5; 5 MG/1; MG/1; MG/1; MG/1
CAPSULE, EXTENDED RELEASE ORAL
COMMUNITY
Start: 2025-06-19

## 2025-07-24 RX ORDER — MUPIROCIN 2 %
OINTMENT (GRAM) TOPICAL
COMMUNITY
Start: 2025-06-04

## 2025-07-24 RX ORDER — CELECOXIB 200 MG/1
200 CAPSULE ORAL
Qty: 60 CAPSULE | Refills: 5 | Status: SHIPPED | OUTPATIENT
Start: 2025-07-24

## 2025-07-24 ASSESSMENT — RHEUMATOLOGY FOLLOW-UP QUESTIONNAIRE
DURATION: >1 HOUR
CHARACTERISTIC: BETTER WITH ACTIVITY
SKIN PLAQUES: Y
JOINT PAIN: BETTER WITH ACTIVITY
BODY ACHES: Y
MARK ALL THE AREAS OF PAIN: 123286397

## 2025-07-24 ASSESSMENT — FIBROSIS 4 INDEX: FIB4 SCORE: 0.58

## 2025-07-24 NOTE — PATIENT INSTRUCTIONS
[facility]    Below are important guidelines to help you navigate your health care needs and assist us in caring for you safely and effectively. We encourage you to carefully read and understand this information and adhere to them accordingly.    [facility] [employer] Messaging and Phone Calls:  Diagnosis and Treatment - For a detailed explanation of your condition and treatment plan from today's visit, refer to the visit note on True [facility] via the following steps:  Log in to True Office and click on “Visits” at the top.  Scroll down to “Past Visits” under Appointments.  Click on “View Notes” under the appropriate visit date.  Questions or Concerns - MyChart messaging is for non-urgent matters that do not require immediate attention and should be brief with no more than two questions or concerns. If you have multiple questions or concerns, we ask that you schedule an appointment to have them properly addressed.  Response to Messages - [facility] messages are addressed throughout the week depending on clinical availability, so we ask that you allow up to one week for a response.  Phone Calls and Voicemails - Phone calls and voicemail messages are reserved for time-sensitive matters that cannot wait to be addressed via [facility] [employer]. We ask that you refrain from calling the office multiple times or leaving multiple voicemails regarding the same issue as doing so may lead to delays in response time.  Urgent Issues - For urgent medical matters or medical emergencies that cannot wait, you are advised to go to your nearest Urgent Care or Emergency Department for immediate attention.    Laboratory Tests and Imaging Studies:  Future Lab and Imaging Orders - We ask that you get your lab tests and imaging studies done no later than one week before your follow-up visit unless instructed otherwise.  Results Communication - You may see some test results marked as “abnormal” that are not necessarily significant or concerning. If  there are significant abnormalities on your test results that warrant further action, you will be notified via MyChart or phone call, otherwise they will be addressed at your follow-up visit.    Prescriptions and Refill Requests:  General Prescriptions (e.g. prednisone, hydroxychloroquine, leflunomide, methotrexate, etc.) - These are sent to Retail Pharmacies, so all refill requests of these medications should be directed to your local pharmacy.  Specialty Prescriptions (e.g. Enbrel, Humira, Cosentyx, Xeljanz, etc.) - These are sent to Specialty Pharmacies, so all refill requests of these medications should be directed to your designated specialty pharmacy.  Infusion Prescriptions (e.g. Remicade, Simponi Aria, Rituxan, Saphnelo, etc.) - These are sent to Outpatient Infusion Centers, so all scheduling requests of these medications should be directed to your local infusion center.    Medication Risks and Adverse Effects:  Immunosuppressed Status - Steroids and antirheumatic drugs are immunosuppressants, so they increase the risk of infections and can have side effects on various organ systems in your body, though most of them are uncommon.  Potential Side Effects - Be sure to read the drug package inserts to learn about the potential side effects of your medications before you start taking them and take them exactly as prescribed unless instructed otherwise.  In Case of Side Effects - If you experience any significant side effects, stop taking the medication immediately and promptly notify the prescriber. Depending on the severity of the side effects, consider going to an Urgent Care or Emergency Department for immediate attention.    Immunizations and Health Screening:  Vaccinations - If you are on immunosuppressive therapy, it is important that you are up to date on age-appropriate immunizations, particularly shingles and pneumonia vaccines, which you can request from your primary care provider or from us at your  next appointment.  Screening Tests - It is also important that you are up to date on age-appropriate screening tests, such as pap smear, mammography, and colonoscopy, which you can request from your primary care provider.    Educational and Supportive Resources:  [facility] ([email or web address]) - Visit our website to learn more about your condition and other rheumatic diseases, and gain access to many helpful resources for them.  Disposal of Old Medications (www.jami.gov/everyday-takeback-day) - Visit the Drug Enforcement Administration website to find a nearby location where you can properly dispose of old medications you no longer need.  Disposal of Used [place] (www.safeneedledisposal.org) - Visit the Safe Needle Disposal Organization website to find a nearby location where you can properly dispose of used needles from your injectable medications.

## 2025-07-24 NOTE — PROGRESS NOTES
[facility] RHEUMATOLOGY  [address], [address], [place] [address]  Phone: [phone] ? Fax: [phone]  [facility]/Health-Services/Rheumatology    VIRTUAL VIDEO FOLLOW-UP VISIT NOTE    This evaluation was conducted via Microsoft Office Teams using secure and encrypted videoconferencing technology for virtual visit. The patient was in their home in the [facility]. The patient's identity was confirmed and verbal consent was obtained for this encounter.         Subjective     DATE OF SERVICE: 07/24/2025    PRIMARY CARE PROVIDER:  Julian Almazan M.D.  [address]  [place] [address]    PATIENT IDENTIFICATION:  Johny Candelario  [address]  [place] [address]    YOB: 1990    MEDICAL RECORD NUMBER: [id number]         CHIEF COMPLAINT:   Chief Complaint   Patient presents with    Follow-Up     HLA-B27 non-radiographic axial spondyloarthritis (HCC)       RHEUMATOLOGIC HISTORY:  Johny Candelario is a 35 y.o. male with pertinent history notable for HLA-B27 positive non-radiographic axial spondyloarthritis diagnosed in 2020 (reportedly symptomatic from 4/2020), DJD of lumbosacral spine, and serologic autoimmunity (positive CAMILLE). Previously under the care of a rheumatologist in [place] (Dr. Loretta Alva at [facility] in [place], [place]), he relocated to [place] in 6/2023 and initially presented on 1/16/24 to establish care for continued evaluation and management of his condition. Reported onset of symptoms in 4/2020 with chronic lower back pain with prolonged morning stiffness that improved with activity which prompted his diagnostic evaluation at the time. Following his spondyloarthritis diagnosis, he was initially started on Humira which he took for only 1 dose before being switched to Cosentyx due to insurance issues. He had been doing quite well since then with no significant back pain or stiffness and felt that Cosentyx had been very helpful  for his condition. However, he reported a history of episodic yeast infection of the anus with some fissures for which he had been using antifungal and topical anesthetic which provided some relief. He had also been noticing increased mucus in his stool and some blood after wiping which he thought was from the anal fissure. Given a family history of colon cancer in his father, he reportedly underwent colonoscopy in 11/2023 by a colorectal surgeon, Dr. Kyle Raymond in [place], [place] (showed colonic inflammation per result below) and later established with gastroenterology in [place]. Reported other aspects of his symptoms and medical history as noted on the initial visit questionnaire.     Pertinent treatments: Gabapentin 100 mg twice daily PRN (unclear benefit), meloxicam 15>7.5 mg QD>BID PRN (last ordered 6/27/24, effective), Humira (stopped after 1 dose due to insurance issues), Cosentyx 150 mg SC every 2 weeks (2020-2/2024, partially effective), Humira 40 mg SC every 2 weeks (ordered 1/16/24-present).     Pertinent positive labs: Positive HLA-B27 and positive CAMILLE 1:160 with negative reflex panel (in 8/2021 per former rheumatologist's note); positive fecal lactoferrin and fecal calprotectin 122 (in 2/2024).     Pertinent negative labs: Negative RF, anti-CCP, C3 and C4 (in 8/2021), SPEP, HBV, and QTB (in 10/2022), ASCA (in 1/2024), CRP, ESR, eGFR, creatinine, LFTs, and CBC (in 8/2024).     Colonoscopy with biopsy (in 11/2023 at [facility]): Colonic/rectal mucosa with increase in surface intraepithelial neutrophils and eosinophils, and lymphoid aggregates in lamina propria.     Pertinent XR imaging (in 10/2022 reports on former rheumatologist's note): Lumbar spine with facet arthrosis from L3 through S1, mild at L3-L4, mild-to-moderate at L4-L5, and moderate-to-severe at L5-S1; spondylitic ridging of the posterior endplates at L5-S1 suspicious for spondylolysis with spondylolisthesis. Thoracic spine and  SI joints with no evidence of degenerative or inflammatory arthropathy.      INTERVAL HISTORY:  Reports interval history as noted on the questionnaire below or scanned under media tab.  Reportedly recurrent painful cracking and bleeding at the corners of the mouth that responded temporally to clotrimazole-betamethasone.  Notable for low back pain that worsen with certain activities and he found that only tramadol is helpful.  [facility] Established Patient History Form    7/24/2025 10:51 AM PDT - Filed by Patient   MAIN REASON FOR VISIT back tightness + pain, lip issues   INTERVAL HISTORY OF ILLNESS   Date of worsening onset: 2-4 weeks ago   Preceding incident/ailment:    Describe/list your symptoms:    Exacerbating factors:    Alleviating factors:    Helpful medications:    Ineffective medications:    Severity of pain (scale of 1-10):    Personal/emotional stressors:    Vel All The Areas Of Pain    REVIEW OF SYMPTOMS    General   Fevers    Chills    Night sweats    Malaise    Fatigue    Unintentional weight loss    Musculoskeletal   Joint pain Better with activity   Morning stiffness duration >1 hour   Morning stiffness characteristic Better with activity   Joint swelling    Joint instability    Tendon pain    Muscle pain    Body aches Yes   Dermatologic   Hair loss with bald spots    Hair shedding    Skin thickening    Skin plaques Yes   Sunlight-induced skin rash    Cold-induced color changes (white, purple, red on rewarming)    Neurologic/Psychiatric   Weakness    Spasms    Tingling    Burning    Numbness    Insomnia    Anxiety    Depression    Head/Eyes   Headaches    Temple pain    Dizziness    Dry eyes    Eye pain    Eye redness    Blurry vision    Vision loss    Ears/Nose   Ear pain    Ringing in ears    Vertigo    Hearing loss    Nasal ulcers    Nosebleeds    Sinus pain    Nasal congestion    Snoring    Mouth/Throat   Oral ulcers    Bleeding gums    Dry mouth    Cavities    Sore throat    Sticking in  throat    Difficulty speaking    Neck/Lymphatics   Thyroid pain    Thyroid swelling    Lymph node swelling    Cardiac/Respiratory   Chest pain with breathing    Dry cough    Cough with bloody phlegm    Shortness of breath    Fast heartbeats    Irregular heartbeats    Gastrointestinal   Nausea    Vomiting    Difficulty swallowing    Heartburn    Abdominal pain    Bloody stool    Mucus stool    Genitourinary   Pelvic pain    Genital ulcers    Abnormal discharge    Burning urination    Frothy urine    Blood in urine        REVIEW OF SYSTEMS:  Except as noted in the history above, relevant review of systems with emphasis on autoimmune rheumatic diseases was otherwise negative.      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    Positive fecal immunochemical test 02/28/2024    HLA-B27 non-radiographic axial spondyloarthritis (HCC) 01/16/2024    DJD (degenerative joint disease), lumbosacral 01/16/2024    Immunosuppressed status (HCC) 01/16/2024    Increased mucus in stool 01/16/2024       PAST MEDICAL HISTORY:  Past Medical History[1]    PAST SURGICAL HISTORY:  Past Surgical History[2]    SOCIAL HISTORY:  Social History     Socioeconomic History    Marital status: Single     Spouse name: Not on file    Number of children: Not on file    Years of education: Not on file    Highest education level: Not on file   Occupational History    Not on file   Tobacco Use    Smoking status: Never    Smokeless tobacco: Never    Tobacco comments:     Does use nicotine patches    Vaping Use    Vaping status: Never Used   Substance and Sexual Activity    Alcohol use: Yes     Comment: moderate use    Drug use: Never    Sexual activity: Not on file   Other Topics Concern    Not on file   Social History Narrative    Not on file     Social Drivers of Health     Financial  "Resource Strain: Not on file   Food Insecurity: Not on file   Transportation Needs: Not on file   Physical Activity: Not on file   Stress: Not on file   Social Connections: Not on file   Intimate Partner Violence: Not on file   Housing Stability: Not on file       FAMILY HISTORY:  No family history on file.    MEDICATIONS:  Current Outpatient Medications   Medication Sig    traMADol (ULTRAM) 50 MG Tab     amphetamine-dextroamphetamine (ADDERALL XR) 20 MG per XR capsule     clotrimazole-betamethasone (LOTRISONE) 1-0.05 % Cream     mupirocin (BACTROBAN) 2 % Ointment     celecoxib (CELEBREX) 200 MG Cap Take 1 Capsule by mouth 2 times daily with meals as needed (for arthritis).    Adalimumab-ryvk 40 MG/0.4ML Auto-injector Kit Inject 40 mg under the skin every 14 days.    tizanidine (ZANAFLEX) 2 MG tablet Take 1 Tablet by mouth 3 times a day as needed (for muscle spasm).    buPROPion SR (WELLBUTRIN-SR) 100 MG TABLET SR 12 HR Take 150 mg by mouth.    Adalimumab (HUMIRA, 2 PEN,) 40 MG/0.4ML Auto-injector Kit Inject 40 mg under the skin every 14 days. (Patient not taking: Reported on 7/24/2025)       ALLERGIES:   Allergies[3]    IMMUNIZATIONS:  Immunization History   Administered Date(s) Administered    MODERNA SARS-COV-2 VACCINE (12+) 03/08/2021, 04/05/2021            Objective     Vital Signs: Ht 1.778 m (5' 10\")   Wt 76.2 kg (168 lb) Body mass index is 24.11 kg/m².    General: Appears well and comfortable  Eyes: Not applicable  ENT: Not applicable  Head/Neck: Not applicable  Cardiovascular: Not applicable  Respiratory: Breathing quiet and unlabored  Gastrointestinal: Not applicable  Integumentary: Not applicable  Musculoskeletal: Not applicable  Neurologic: Not applicable  Psychiatric: Mood and affect appropriate      LABORATORY RESULTS REVIEWED AND INTERPRETED:  Lab Results   Component Value Date/Time    CREACTPROT <0.30 08/13/2024 09:22 AM    CREACTPROT <0.30 01/16/2024 03:13 PM    SEDRATEWES 15 08/13/2024 09:22 AM "    SEDRATEWES 12 01/16/2024 03:13 PM     Lab Results   Component Value Date/Time    WBC 6.1 08/13/2024 09:22 AM    RBC 4.72 08/13/2024 09:22 AM    HEMOGLOBIN 15.4 08/13/2024 09:22 AM    HEMATOCRIT 45.8 08/13/2024 09:22 AM    MCV 97.0 08/13/2024 09:22 AM    MCH 32.6 08/13/2024 09:22 AM    MCHC 33.6 08/13/2024 09:22 AM    RDW 42.4 08/13/2024 09:22 AM    PLATELETCT 319 08/13/2024 09:22 AM    MPV 9.9 08/13/2024 09:22 AM    NEUTS 2.12 08/13/2024 09:22 AM    LYMPHOCYTES 49.00 (H) 08/13/2024 09:22 AM    MONOCYTES 7.70 08/13/2024 09:22 AM    EOSINOPHILS 7.40 (H) 08/13/2024 09:22 AM    BASOPHILS 0.80 08/13/2024 09:22 AM     Lab Results   Component Value Date/Time    ASTSGOT 21 08/13/2024 09:22 AM    ALTSGPT 16 08/13/2024 09:22 AM    ALKPHOSPHAT 80 08/13/2024 09:22 AM    TBILIRUBIN 0.6 08/13/2024 09:22 AM    TOTPROTEIN 7.2 08/13/2024 09:22 AM    ALBUMIN 4.3 08/13/2024 09:22 AM     Lab Results   Component Value Date/Time    SODIUM 136 08/13/2024 09:22 AM    POTASSIUM 4.7 08/13/2024 09:22 AM    CHLORIDE 99 08/13/2024 09:22 AM    CO2 25 08/13/2024 09:22 AM    GLUCOSE 90 08/13/2024 09:22 AM    BUN 20 08/13/2024 09:22 AM    CREATININE 1.07 08/13/2024 09:22 AM    CALCIUM 9.3 08/13/2024 09:22 AM       RADIOLOGY RESULTS REVIEWED AND INTERPRETED:  No relevant loadable results found in the system. Pertinent non-system results, if applicable, summarized under history above.       All relevant laboratory and imaging results reported on this note were reviewed and interpreted by me.         Assessment & Plan     Johny Candelario is a 35 y.o. male with history as noted above whose presentation merits the following clinical impressions and recommendations:    1. HLA-B27 non-radiographic axial spondyloarthritis (HCC)  Clinically and serologically without significant evidence of disease activity on the current regimen of Humira, so no need for modification of treatment. Presumably his current complaint of low back pain is secondary to  spinal arthralgia in the setting of his lumbosacral DJD given it worsens with activities. The previous presence of anti-adalimumab antibody raises some concern for possible eventual neutralization which could render the medication ineffective, so would need repeat testing at this time based on which additional recommendations may be provided.  - CRP QUANTITIVE (NON-CARDIAC); Future  - Sed Rate; Future  - ADALIMUMAB AND ABS, QUANT; Future  - celecoxib (CELEBREX) 200 MG Cap; Take 1 Capsule by mouth 2 times daily with meals as needed (for arthritis).  Dispense: 60 Capsule; Refill: 5  - Continue Humira 40 mg SC every 2 weeks  - Consider sulfasalazine depending on his clinical and serological trajectory    2. DJD (degenerative joint disease), lumbosacral  Presumably a significant etiology of his chronic back pain which can be managed supportively. Also associated with intermittent upper back muscular spasms.  - Topical analgesics, Tylenol Arthritis, and an oral NSAID (Celecoxib as above) as needed  - Consider referral to pain management if that becomes necessary    3. Angular cheilitis  Presumably secondary to a fungal infection that responds temporally to topical antifungal/antiinflammatory cream. However, given the recurrence after treatment, would be reasonable to check B vitamins levels to r/o nutritional deficiency. Also given the presence of painful ulcers in his mouth and presumably the history of inflammation in his bowel, reasonable to risk stratify for Behcet disease.  - Miscellaneous Test (HLA-B51); Future  - VITAMIN B1; Future  - VITAMIN B2 (RIBOFLAVIN); Future  - VITAMIN B3 (NIACIN AND METABOLITES), S/P; Future  - FOLATE; Future  - VITAMIN B12; Future  - Recommend to take vitamin B complex vitamin supplement daily  - Continue Lotrisone cream as needed (helpful)    4. Spongiotic psoriasiform dermatitis  Raised concern for possible drug-induced psoriasis secondary to Humira in the setting of his HLA-B27  positivity. Clinically much improved following a previous course of topical steroid by dermatology s/p skin punch biopsy that showed spongiotic psoriasiform dermatitis.  - Routine follow-up with dermatology as needed    5. Positive fecal immunochemical test  Initially raised concern for IBD in the setting of his positive HLA-B27 given history of stool mucus with blood, positive fecal calprotectin and fecal lactoferrin, hence the switch from Cosentyx to Humira. However, based on the colonoscopy report sent to the patient, it is unremarkable for specific lesions or polyps other than internal hemorrhoids.  - Consider sulfasalazine as above depending on his clinical trajectory  - May need to obtain pathology report if any from colonoscopy in 3/2024 at [facility]  - Consider repeat colonoscopy as indicated per GI recommendations depending on his clinical trajectory    6. Immunosuppressed status (HCC)  Presently with no history, physical, or laboratory evidence to suggest significant adverse drug effects or opportunistic infections, but need routine monitoring per guidelines.   - CBC WITH DIFFERENTIAL; Future  - Comp Metabolic Panel; Future  - Need to ascertain age-appropriate vaccines in addition to the ones listed above under immunizations       The above assessment and plan were discussed with the patient who acknowledged understanding of the plan.    FOLLOW-UP: Return in about 3 months (around 10/24/2025) for Short.         Thank you for the opportunity to participate in the care of Johny Candelario.    Denise Canseco M.D.   Internal Medicine Resident, PGY-3      ATTENDING ATTESTATION:  I personally saw and examined this patient, supervised and discussed the case with the resident who participated in the care of the patient. I have carefully reviewed the note in its entirety, made modifications as deemed appropriate, and agree with its content. I hereby attest that the documentation accurately reflects  the history, physical exam, assessment and plan of care for the patient.    Ap Garcia MD, MS, FACR  Rheumatologist, [facility], [facility]   of Clinical Medicine, Department of Internal Medicine  [employer] [facility] [employer]       [1] No past medical history on file.  [2] No past surgical history on file.  [3] No Known Allergies